# Patient Record
Sex: FEMALE | Race: WHITE | NOT HISPANIC OR LATINO | Employment: OTHER | URBAN - NONMETROPOLITAN AREA
[De-identification: names, ages, dates, MRNs, and addresses within clinical notes are randomized per-mention and may not be internally consistent; named-entity substitution may affect disease eponyms.]

---

## 2017-01-10 ENCOUNTER — TRANSCRIBE ORDERS (OUTPATIENT)
Dept: ADMINISTRATIVE | Facility: HOSPITAL | Age: 67
End: 2017-01-10

## 2017-01-10 DIAGNOSIS — Z12.31 VISIT FOR SCREENING MAMMOGRAM: Primary | ICD-10-CM

## 2017-01-16 ENCOUNTER — HOSPITAL ENCOUNTER (OUTPATIENT)
Dept: MAMMOGRAPHY | Facility: HOSPITAL | Age: 67
Discharge: HOME | End: 2017-01-16
Attending: FAMILY MEDICINE

## 2017-01-20 ENCOUNTER — HOSPITAL ENCOUNTER (OUTPATIENT)
Dept: MAMMOGRAPHY | Facility: HOSPITAL | Age: 67
Discharge: HOME | End: 2017-01-20
Attending: FAMILY MEDICINE | Admitting: FAMILY MEDICINE

## 2017-01-20 DIAGNOSIS — Z12.31 VISIT FOR SCREENING MAMMOGRAM: ICD-10-CM

## 2017-01-20 PROCEDURE — G0202 SCR MAMMO BI INCL CAD: HCPCS

## 2017-01-20 PROCEDURE — 77063 BREAST TOMOSYNTHESIS BI: CPT

## 2017-01-20 PROCEDURE — 77067 SCR MAMMO BI INCL CAD: CPT | Performed by: RADIOLOGY

## 2017-01-20 PROCEDURE — 77063 BREAST TOMOSYNTHESIS BI: CPT | Performed by: RADIOLOGY

## 2017-03-20 ENCOUNTER — OFFICE VISIT (OUTPATIENT)
Dept: CARDIOLOGY | Facility: CLINIC | Age: 67
End: 2017-03-20

## 2017-03-20 VITALS
DIASTOLIC BLOOD PRESSURE: 63 MMHG | SYSTOLIC BLOOD PRESSURE: 116 MMHG | HEART RATE: 68 BPM | HEIGHT: 66 IN | WEIGHT: 193 LBS | BODY MASS INDEX: 31.02 KG/M2

## 2017-03-20 DIAGNOSIS — E78.5 DYSLIPIDEMIA: ICD-10-CM

## 2017-03-20 DIAGNOSIS — I05.0 MITRAL VALVE STENOSIS, UNSPECIFIED ETIOLOGY: ICD-10-CM

## 2017-03-20 DIAGNOSIS — E11.9 TYPE 2 DIABETES MELLITUS WITHOUT COMPLICATION, WITHOUT LONG-TERM CURRENT USE OF INSULIN (HCC): ICD-10-CM

## 2017-03-20 DIAGNOSIS — I10 ESSENTIAL HYPERTENSION: Primary | ICD-10-CM

## 2017-03-20 DIAGNOSIS — E66.8 MODERATE OBESITY: ICD-10-CM

## 2017-03-20 PROCEDURE — 99214 OFFICE O/P EST MOD 30 MIN: CPT | Performed by: INTERNAL MEDICINE

## 2017-03-20 PROCEDURE — 93000 ELECTROCARDIOGRAM COMPLETE: CPT | Performed by: INTERNAL MEDICINE

## 2017-03-20 NOTE — PROGRESS NOTES
Melly Wynn  1950 03/20/2017   Ref:No referring provider defined for this encounter.  Pcp: Samuel Duane Kreis, MD  [address] [place] [address] [address] [place] [address]    Follow up for:  Chief Complaint   Patient presents with   • Follow-up        Patient Active Problem List   Diagnosis   • Mild Mitral stenosis with mild mitral regurgitation with mild pulmonary hypertension.   • Hypertension   • Dyslipidemia   • Type 2 diabetes mellitus       HPI     Melly Wynn is a 65 yo female how presents to the follow today for follow-up of mild mitral stenosis. Patient notes overall she is doing well. Patient notes BP at home runs 130's/70's. She notes during the summer she walks 1 mile a day. Patient denies chest pain, shortness of breath, edema, palpitations, dizziness, and no syncope.     Review of Systems   Constitution: Negative for chills and fever.   HENT: Negative for headaches, nosebleeds and sore throat.    Cardiovascular: Negative for chest pain, leg swelling, palpitations and syncope.   Respiratory: Negative for cough, hemoptysis, shortness of breath and wheezing.    Gastrointestinal: Negative for abdominal pain, hematemesis, hematochezia, melena, nausea and vomiting.   Genitourinary: Negative for dysuria and hematuria.   Neurological: Negative for dizziness.         Current Outpatient Prescriptions:   •  alendronate (FOSAMAX) 70 MG tablet, Take 70 mg by mouth every 7 days., Disp: , Rfl:   •  aspirin 81 MG chewable tablet, Chew 81 mg daily., Disp: , Rfl:   •  Calcium Carb-Cholecalciferol (CALCIUM PLUS VITAMIN D3) 600-500 MG-UNIT capsule, Take  by mouth 2 (two) times a day., Disp: , Rfl:   •  diltiazem (CARDIZEM) 120 MG tablet, Take 120 mg by mouth daily., Disp: , Rfl:   •  ferrous gluconate (FERGON) 324 MG tablet, Take 324 mg by mouth daily with breakfast., Disp: , Rfl:   •  glyBURIDE (DIABETA) 5 MG tablet, Take 5 mg by mouth 2 (two) times a day., Disp: , Rfl:   •  levothyroxine (SYNTHROID, LEVOTHROID) 88 MCG  "tablet, Take 88 mcg by mouth daily., Disp: , Rfl:   •  lisinopril (PRINIVIL,ZESTRIL) 20 MG tablet, Take 20 mg by mouth daily., Disp: , Rfl:   •  metFORMIN (GLUCOPHAGE) 500 MG tablet, Take 500 mg by mouth 2 (two) times a day with meals., Disp: , Rfl:   •  metoprolol tartrate (LOPRESSOR) 25 MG tablet, TAKE ONE TABLET BY MOUTH TWICE A DAY, Disp: 60 tablet, Rfl: 1  •  Omega-3 Fatty Acids (FISH OIL) 1000 MG capsule capsule, Take 1,200 mg by mouth daily with breakfast., Disp: , Rfl:   •  rosuvastatin (CRESTOR) 20 MG tablet, Take 20 mg by mouth daily., Disp: , Rfl:   •  topiramate (TOPAMAX) 50 MG tablet, Take 50 mg by mouth 2 (two) times a day., Disp: , Rfl:   •  calcium carbonate (OS-LIDIA) 600 MG tablet, Take 600 mg by mouth daily., Disp: , Rfl:   •  Empagliflozin (JARDIANCE) 10 MG tablet, Take 10 mg by mouth daily., Disp: , Rfl:   •  nitroglycerin (NITROLINGUAL) 0.4 MG/SPRAY spray, Place 1 spray under the tongue every 5 (five) minutes as needed for chest pain., Disp: , Rfl:   •  omeprazole (PriLOSEC) 20 MG capsule, Take 20 mg by mouth daily., Disp: , Rfl:     Allergies   Allergen Reactions   • Lorcet [Hydrocodone-Acetaminophen]    • Penicillins    • Sulfa Antibiotics        Visit Vitals   • /63 (BP Location: Left arm, Patient Position: Sitting, Cuff Size: Adult)   • Pulse 68   • Ht 66\" (167.6 cm)   • Wt 193 lb (87.5 kg)   • BMI 31.15 kg/m2          Physical Exam   Constitutional: She is oriented to person, place, and time. She appears well-developed and well-nourished. No distress.   Moderately obese   Neck: No JVD present. No tracheal deviation present.   Cardiovascular: Normal rate, regular rhythm, normal heart sounds and intact distal pulses.  Exam reveals no gallop and no friction rub.    No murmur heard.  Pulses:       Carotid pulses are 2+ on the right side, and 2+ on the left side.       Posterior tibial pulses are 1+ on the right side, and 1+ on the left side.   Pulmonary/Chest: Effort normal and breath " sounds normal. No respiratory distress. She has no wheezes. She has no rales. She exhibits no tenderness.   Abdominal: Soft. Bowel sounds are normal. She exhibits no distension and no mass. There is no tenderness. There is no rebound and no guarding.   Moderately obese   Musculoskeletal: She exhibits no edema or tenderness.   Neurological: She is alert and oriented to person, place, and time. She displays normal reflexes. She exhibits normal muscle tone. Coordination normal.   Skin: Skin is warm. No rash noted. She is not diaphoretic. No erythema. No pallor.   Psychiatric: She has a normal mood and affect. Her behavior is normal. Judgment and thought content normal.   :    Lab Review:      ECG 12 Lead  Date/Time: 3/20/2017 3:20 PM  Performed by: CAL LINCOLN  Authorized by: CAL LINCOLN   Comparison: compared with previous ECG from 9/19/2016  Similar to previous ECG  Rhythm: sinus rhythm  Rate: normal  BPM: 64  Conduction: LAFB  Clinical impression: abnormal ECG  Comments: QTc: 415            Lab Results   Component Value Date     04/13/2015    K 3.7 04/13/2015     04/13/2015    CO2 21.9 (L) 04/13/2015    BUN 31 (H) 04/13/2015    CREATININE 1.16 04/13/2015    GLUCOSE 163 (H) 04/13/2015    CALCIUM 10.0 04/13/2015    AST 24 04/13/2015    ALT 21 04/13/2015    ALKPHOS 96 04/13/2015    LABIL2 1.9 04/13/2015     No results found for: CKTOTAL  Lab Results   Component Value Date    WBC 10.8 04/13/2015    HGB 12.4 04/13/2015    HCT 35.0 (L) 04/13/2015     04/13/2015     Lab Results   Component Value Date    INR 0.94 04/13/2015     No results found for: MG  No results found for: TSH, PSA, CHLPL, TRIG, HDL, LDL   Lab Results   Component Value Date    BNP 25 04/13/2015       Chemistry        Component Value Date/Time     04/13/2015 2228    K 3.7 04/13/2015 2228     04/13/2015 2228    CO2 21.9 (L) 04/13/2015 2228    BUN 31 (H) 04/13/2015 2228    CREATININE 1.16 04/13/2015 2228         Component Value Date/Time    CALCIUM 10.0 04/13/2015 2228    ALKPHOS 96 04/13/2015 2228    AST 24 04/13/2015 2228    ALT 21 04/13/2015 2228    BILITOT 0.3 04/13/2015 2228            Impression:   Diagnosis Plan   1. Essential hypertension  ECG 12 Lead   2. Mitral valve stenosis, unspecified etiology     3. Type 2 diabetes mellitus without complication, without long-term current use of insulin     4. Dyslipidemia         Plan:  1. Continue same medications      No Follow-up on file.     This document signed by Bere Lovell MA March 20, 2017 4:07 PM     Scribed for Umnag Aguilera MD by Bere Lovell CMA. 3/20/2017  4:07 PM    I, Umang Aguilera MD, personally performed the services described in this documentation as scribed by the above named individual in my presence, and it is both accurate and complete.  5/8/2017  5:30 PM

## 2017-04-29 ENCOUNTER — HOSPITAL ENCOUNTER (EMERGENCY)
Facility: HOSPITAL | Age: 67
Discharge: ANOTHER HOSPITAL | End: 2017-04-29
Attending: FAMILY MEDICINE | Admitting: FAMILY MEDICINE

## 2017-04-29 ENCOUNTER — APPOINTMENT (OUTPATIENT)
Dept: GENERAL RADIOLOGY | Facility: HOSPITAL | Age: 67
End: 2017-04-29

## 2017-04-29 ENCOUNTER — APPOINTMENT (OUTPATIENT)
Dept: CT IMAGING | Facility: HOSPITAL | Age: 67
End: 2017-04-29

## 2017-04-29 VITALS
HEIGHT: 66 IN | TEMPERATURE: 98.5 F | RESPIRATION RATE: 18 BRPM | OXYGEN SATURATION: 100 % | WEIGHT: 185 LBS | DIASTOLIC BLOOD PRESSURE: 66 MMHG | HEART RATE: 74 BPM | SYSTOLIC BLOOD PRESSURE: 118 MMHG | BODY MASS INDEX: 29.73 KG/M2

## 2017-04-29 DIAGNOSIS — I20.0 UNSTABLE ANGINA PECTORIS (HCC): Primary | ICD-10-CM

## 2017-04-29 LAB
ALBUMIN SERPL-MCNC: 4.6 G/DL (ref 3.4–4.8)
ALBUMIN/GLOB SERPL: 2.1 G/DL (ref 1.5–2.5)
ALP SERPL-CCNC: 66 U/L (ref 35–104)
ALT SERPL W P-5'-P-CCNC: 21 U/L (ref 10–36)
ANION GAP SERPL CALCULATED.3IONS-SCNC: 2.8 MMOL/L (ref 3.6–11.2)
AST SERPL-CCNC: 25 U/L (ref 10–30)
BASOPHILS # BLD AUTO: 0.03 10*3/MM3 (ref 0–0.3)
BASOPHILS NFR BLD AUTO: 0.3 % (ref 0–2)
BILIRUB SERPL-MCNC: 0.3 MG/DL (ref 0.2–1.8)
BUN BLD-MCNC: 22 MG/DL (ref 7–21)
BUN/CREAT SERPL: 22 (ref 7–25)
CALCIUM SPEC-SCNC: 10 MG/DL (ref 7.7–10)
CHLORIDE SERPL-SCNC: 114 MMOL/L (ref 99–112)
CO2 SERPL-SCNC: 25.2 MMOL/L (ref 24.3–31.9)
CREAT BLD-MCNC: 1 MG/DL (ref 0.43–1.29)
DEPRECATED RDW RBC AUTO: 45 FL (ref 37–54)
EOSINOPHIL # BLD AUTO: 0.4 10*3/MM3 (ref 0–0.7)
EOSINOPHIL NFR BLD AUTO: 4.4 % (ref 0–5)
ERYTHROCYTE [DISTWIDTH] IN BLOOD BY AUTOMATED COUNT: 13.1 % (ref 11.5–14.5)
GFR SERPL CREATININE-BSD FRML MDRD: 56 ML/MIN/1.73
GLOBULIN UR ELPH-MCNC: 2.2 GM/DL
GLUCOSE BLD-MCNC: 151 MG/DL (ref 70–110)
HCT VFR BLD AUTO: 37.5 % (ref 37–47)
HGB BLD-MCNC: 11.8 G/DL (ref 12–16)
HOLD SPECIMEN: NORMAL
HOLD SPECIMEN: NORMAL
IMM GRANULOCYTES # BLD: 0.01 10*3/MM3 (ref 0–0.03)
IMM GRANULOCYTES NFR BLD: 0.1 % (ref 0–0.5)
LYMPHOCYTES # BLD AUTO: 1.86 10*3/MM3 (ref 1–3)
LYMPHOCYTES NFR BLD AUTO: 20.5 % (ref 21–51)
MCH RBC QN AUTO: 30.6 PG (ref 27–33)
MCHC RBC AUTO-ENTMCNC: 31.5 G/DL (ref 33–37)
MCV RBC AUTO: 97.2 FL (ref 80–94)
MONOCYTES # BLD AUTO: 0.58 10*3/MM3 (ref 0.1–0.9)
MONOCYTES NFR BLD AUTO: 6.4 % (ref 0–10)
NEUTROPHILS # BLD AUTO: 6.19 10*3/MM3 (ref 1.4–6.5)
NEUTROPHILS NFR BLD AUTO: 68.3 % (ref 30–70)
OSMOLALITY SERPL CALC.SUM OF ELEC: 289.4 MOSM/KG (ref 273–305)
PLATELET # BLD AUTO: 192 10*3/MM3 (ref 130–400)
PMV BLD AUTO: 11.1 FL (ref 6–10)
POTASSIUM BLD-SCNC: 4.3 MMOL/L (ref 3.5–5.3)
PROT SERPL-MCNC: 6.8 G/DL (ref 6–8)
RBC # BLD AUTO: 3.86 10*6/MM3 (ref 4.2–5.4)
SODIUM BLD-SCNC: 142 MMOL/L (ref 135–153)
TROPONIN I SERPL-MCNC: 0.29 NG/ML
TROPONIN I SERPL-MCNC: 0.51 NG/ML
WBC NRBC COR # BLD: 9.07 10*3/MM3 (ref 4.5–12.5)
WHOLE BLOOD HOLD SPECIMEN: NORMAL
WHOLE BLOOD HOLD SPECIMEN: NORMAL

## 2017-04-29 PROCEDURE — 80053 COMPREHEN METABOLIC PANEL: CPT | Performed by: FAMILY MEDICINE

## 2017-04-29 PROCEDURE — 71020 HC CHEST PA AND LATERAL: CPT

## 2017-04-29 PROCEDURE — 99285 EMERGENCY DEPT VISIT HI MDM: CPT

## 2017-04-29 PROCEDURE — 36415 COLL VENOUS BLD VENIPUNCTURE: CPT

## 2017-04-29 PROCEDURE — 85025 COMPLETE CBC W/AUTO DIFF WBC: CPT | Performed by: FAMILY MEDICINE

## 2017-04-29 PROCEDURE — 84484 ASSAY OF TROPONIN QUANT: CPT | Performed by: FAMILY MEDICINE

## 2017-04-29 PROCEDURE — 70450 CT HEAD/BRAIN W/O DYE: CPT

## 2017-04-29 PROCEDURE — 70450 CT HEAD/BRAIN W/O DYE: CPT | Performed by: RADIOLOGY

## 2017-04-29 PROCEDURE — 93010 ELECTROCARDIOGRAM REPORT: CPT | Performed by: INTERNAL MEDICINE

## 2017-04-29 PROCEDURE — 93005 ELECTROCARDIOGRAM TRACING: CPT | Performed by: FAMILY MEDICINE

## 2017-04-29 PROCEDURE — 71020 XR CHEST 2 VW: CPT | Performed by: RADIOLOGY

## 2017-04-29 RX ORDER — SODIUM CHLORIDE 0.9 % (FLUSH) 0.9 %
10 SYRINGE (ML) INJECTION AS NEEDED
Status: DISCONTINUED | OUTPATIENT
Start: 2017-04-29 | End: 2017-04-30 | Stop reason: HOSPADM

## 2017-04-29 RX ORDER — ASPIRIN 325 MG
325 TABLET ORAL ONCE
Status: COMPLETED | OUTPATIENT
Start: 2017-04-29 | End: 2017-04-29

## 2017-04-29 RX ORDER — ACETAMINOPHEN 325 MG/1
1000 TABLET ORAL ONCE
Status: DISCONTINUED | OUTPATIENT
Start: 2017-04-29 | End: 2017-04-29

## 2017-04-29 RX ORDER — ACETAMINOPHEN 325 MG/1
TABLET ORAL
Status: DISCONTINUED
Start: 2017-04-29 | End: 2017-04-30 | Stop reason: HOSPADM

## 2017-04-29 RX ORDER — ACETAMINOPHEN 325 MG/1
975 TABLET ORAL ONCE
Status: COMPLETED | OUTPATIENT
Start: 2017-04-29 | End: 2017-04-29

## 2017-04-29 RX ADMIN — NITROGLYCERIN 1 INCH: 20 OINTMENT TOPICAL at 19:05

## 2017-04-29 RX ADMIN — ACETAMINOPHEN 975 MG: 325 TABLET ORAL at 19:04

## 2017-04-29 RX ADMIN — ASPIRIN 325 MG: 325 TABLET ORAL at 17:19

## 2017-06-01 ENCOUNTER — TELEPHONE (OUTPATIENT)
Dept: CARDIOLOGY | Facility: CLINIC | Age: 67
End: 2017-06-01

## 2017-06-01 ENCOUNTER — OFFICE VISIT (OUTPATIENT)
Dept: CARDIOLOGY | Facility: CLINIC | Age: 67
End: 2017-06-01

## 2017-06-01 VITALS
HEIGHT: 66 IN | SYSTOLIC BLOOD PRESSURE: 119 MMHG | HEART RATE: 88 BPM | RESPIRATION RATE: 16 BRPM | WEIGHT: 198 LBS | BODY MASS INDEX: 31.82 KG/M2 | DIASTOLIC BLOOD PRESSURE: 71 MMHG

## 2017-06-01 DIAGNOSIS — E11.8 TYPE 2 DIABETES MELLITUS WITH COMPLICATION, WITHOUT LONG-TERM CURRENT USE OF INSULIN (HCC): ICD-10-CM

## 2017-06-01 DIAGNOSIS — I10 ESSENTIAL HYPERTENSION: ICD-10-CM

## 2017-06-01 DIAGNOSIS — E78.5 DYSLIPIDEMIA: ICD-10-CM

## 2017-06-01 DIAGNOSIS — I21.4 NON-ST ELEVATION MYOCARDIAL INFARCTION (NSTEMI) (HCC): Primary | ICD-10-CM

## 2017-06-01 DIAGNOSIS — I05.0 MITRAL VALVE STENOSIS, UNSPECIFIED ETIOLOGY: ICD-10-CM

## 2017-06-01 PROCEDURE — 99213 OFFICE O/P EST LOW 20 MIN: CPT | Performed by: PHYSICIAN ASSISTANT

## 2017-06-01 NOTE — PROGRESS NOTES
Samuel Duane Kreis, MD  Melly Wynn  1950 06/01/2017    Patient Active Problem List   Diagnosis   • Mild Mitral stenosis with mild mitral regurgitation with mild pulmonary hypertension.   • Hypertension   • Dyslipidemia   • Type 2 diabetes mellitus   • Moderate obesity     Dear Samuel Duane Kreis, MD:    Chief Complaint   Patient presents with   • Follow-up     [facility] in [place]   • Meds     List     Subjective     Melly Wynn is a 66 y.o. female with a past medical history significant forMild mitral stenosis, hypertension, dyslipidemia, and type 2 diabetes mellitus.  The patient was apparently recently treated for myocardial infarction and had a cardiac catheterization at [facility] in [facility].  Review of records revealed that she presented to the emergency department [facility] on 4/29/2017 with complaints of chest pain.  Troponin levels were elevated at 0.293 and 0.511.  Patient chose to be transferred to [facility].  Her cardiac catheterization there did not reveal any significant coronary artery disease and she was discharged home.  She states she was also checked for blood clots in her legs and lungs and was negative per the patient.  She presents back to the office today for follow-up visit. She has not had any further chest pains.  She states that at the time of her chest pain she was under a lot of stress.      Current Outpatient Prescriptions:   •  alendronate (FOSAMAX) 70 MG tablet, Take 70 mg by mouth every 7 days., Disp: , Rfl:   •  aspirin 81 MG chewable tablet, Chew 81 mg daily., Disp: , Rfl:   •  Calcium Carb-Cholecalciferol (CALCIUM PLUS VITAMIN D3) 600-500 MG-UNIT capsule, Take  by mouth 2 (two) times a day., Disp: , Rfl:   •  diltiazem (CARDIZEM) 120 MG tablet, Take 120 mg by mouth daily., Disp: , Rfl:   •  Ferrous Sulfate (IRON) 28 MG tablet, Take  by mouth., Disp: , Rfl:   •  glyBURIDE (DIABETA) 5 MG tablet, Take  5 mg by mouth 2 (two) times a day., Disp: , Rfl:   •  levothyroxine (SYNTHROID, LEVOTHROID) 88 MCG tablet, Take 88 mcg by mouth daily., Disp: , Rfl:   •  lisinopril (PRINIVIL,ZESTRIL) 20 MG tablet, Take 20 mg by mouth daily., Disp: , Rfl:   •  metFORMIN (GLUCOPHAGE) 500 MG tablet, Take 500 mg by mouth 2 (two) times a day with meals., Disp: , Rfl:   •  nitroglycerin (NITROLINGUAL) 0.4 MG/SPRAY spray, Place 1 spray under the tongue every 5 (five) minutes as needed for chest pain., Disp: , Rfl:   •  Omega-3 Fatty Acids (FISH OIL) 1000 MG capsule capsule, Take 1,200 mg by mouth daily with breakfast., Disp: , Rfl:   •  omeprazole (PriLOSEC) 20 MG capsule, Take 20 mg by mouth daily., Disp: , Rfl:   •  rosuvastatin (CRESTOR) 20 MG tablet, Take 20 mg by mouth daily., Disp: , Rfl:   •  topiramate (TOPAMAX) 50 MG tablet, Take 50 mg by mouth 2 (two) times a day., Disp: , Rfl:   •  metoprolol tartrate (LOPRESSOR) 25 MG tablet, TAKE ONE TABLET BY MOUTH TWICE A DAY, Disp: 60 tablet, Rfl: 1    The following portions of the patient's history were reviewed and updated as appropriate: allergies, current medications, past family history, past medical history, past social history, past surgical history and problem list.    Social History     Social History   • Marital status:      Spouse name: N/A   • Number of children: N/A   • Years of education: N/A     Occupational History   • Not on file.     Social History Main Topics   • Smoking status: Never Smoker   • Smokeless tobacco: Never Used   • Alcohol use No   • Drug use: No   • Sexual activity: Not on file     Other Topics Concern   • Not on file     Social History Narrative     Review of Systems   Constitution: Negative for fever and weakness.   Cardiovascular: Negative for chest pain, dyspnea on exertion, leg swelling, near-syncope, palpitations and syncope.   Respiratory: Negative for shortness of breath.    Neurological: Negative for dizziness.     Objective   Blood  "pressure 119/71, pulse 88, resp. rate 16, height 66\" (167.6 cm), weight 198 lb (89.8 kg).    Physical Exam   Constitutional: She is oriented to person, place, and time. She appears well-developed and well-nourished. No distress.   HENT:   Head: Normocephalic and atraumatic.   Eyes: Conjunctivae are normal. Right eye exhibits no discharge. Left eye exhibits no discharge.   Neck: Normal range of motion. Neck supple. Carotid bruit is not present.   Cardiovascular: Normal rate, regular rhythm and normal heart sounds.  Exam reveals no gallop and no friction rub.    No murmur heard.  Pulmonary/Chest: Effort normal and breath sounds normal. No respiratory distress. She has no wheezes. She has no rales. She exhibits no tenderness.   Musculoskeletal: Normal range of motion. She exhibits no edema.   Neurological: She is alert and oriented to person, place, and time.   Skin: Skin is warm and dry. No rash noted. She is not diaphoretic. No erythema. No pallor.   Psychiatric: She has a normal mood and affect. Her behavior is normal.   Nursing note and vitals reviewed.    Procedures   Cardiac catheterization [facility] 5/1/17    Assessment:          Diagnosis Plan   1. ASCVD (arteriosclerotic cardiovascular disease)     2. Essential hypertension     3. Mitral valve stenosis, unspecified etiology     4. Dyslipidemia     5. Type 2 diabetes mellitus with complication, without long-term current use of insulin          Plan:       1. Will try to get recent records from [facility].   2. If no echocardiogram was performed we will order one.  3. Will get blood work from Dr. Gilliam' office.   4. Continue aspirin and crestor.  She states that her metoprolol was discontinued while at [facility].  She is unclear the reason.  We'll continue to hold it for now.  5. We'll follow-up patient in 2 months or sooner if needed.    No Follow-up on file.    I appreciate the opportunity to participate in this " patient's cardiovascular care.    Best Regards,    Ute Doherty PA-C

## 2017-08-04 ENCOUNTER — OFFICE VISIT (OUTPATIENT)
Dept: CARDIOLOGY | Facility: CLINIC | Age: 67
End: 2017-08-04

## 2017-08-04 VITALS
HEART RATE: 59 BPM | DIASTOLIC BLOOD PRESSURE: 63 MMHG | RESPIRATION RATE: 16 BRPM | WEIGHT: 186 LBS | SYSTOLIC BLOOD PRESSURE: 104 MMHG | HEIGHT: 66 IN | BODY MASS INDEX: 29.89 KG/M2

## 2017-08-04 DIAGNOSIS — E78.5 DYSLIPIDEMIA: ICD-10-CM

## 2017-08-04 DIAGNOSIS — I10 ESSENTIAL HYPERTENSION: Primary | ICD-10-CM

## 2017-08-04 DIAGNOSIS — E11.9 TYPE 2 DIABETES MELLITUS WITHOUT COMPLICATION, WITHOUT LONG-TERM CURRENT USE OF INSULIN (HCC): ICD-10-CM

## 2017-08-04 PROCEDURE — 99213 OFFICE O/P EST LOW 20 MIN: CPT | Performed by: PHYSICIAN ASSISTANT

## 2017-08-04 RX ORDER — CHOLECALCIFEROL (VITAMIN D3) 125 MCG
2000 CAPSULE ORAL 2 TIMES DAILY
COMMUNITY
End: 2020-07-17 | Stop reason: ALTCHOICE

## 2017-08-04 NOTE — PROGRESS NOTES
Samuel Duane Kreis, MD  Melly Wynn  1950 08/04/2017    Patient Active Problem List   Diagnosis   • Mild Mitral stenosis with mild mitral regurgitation with mild pulmonary hypertension.   • Hypertension   • Dyslipidemia   • Type 2 diabetes mellitus   • Moderate obesity       Dear Samuel Duane Kreis, MD:    Chief Complaint   Patient presents with   • Follow-up     2 mos   • meds     list provided     Subjective     Melly Wynn is a 66 y.o. female with a past medical history significant for Mild mitral stenosis, hypertension, dyslipidemia, and type 2 diabetes mellitus.  In April 2017, it was felt that she possibly had a non-ST elevation myocardial infarction was transferred to [facility].  At that time her cardiac catheterization did not reveal any significant coronary artery disease and she was discharged home. She denies any recent complaints of shortness of breath or chest pain.  No dizziness, near syncope, or palpitations.  No edema of the lower extremities.  Overall she has been doing well.      Current Outpatient Prescriptions:   •  alendronate (FOSAMAX) 70 MG tablet, Take 70 mg by mouth every 7 days., Disp: , Rfl:   •  aspirin 81 MG chewable tablet, Chew 81 mg daily., Disp: , Rfl:   •  Calcium Carb-Cholecalciferol (CALCIUM PLUS VITAMIN D3) 600-500 MG-UNIT capsule, Take  by mouth 2 (two) times a day., Disp: , Rfl:   •  Cholecalciferol (VITAMIN D3) 2000 UNITS tablet, Take 2,000 Units by mouth 2 (Two) Times a Day., Disp: , Rfl:   •  diltiazem (CARDIZEM) 120 MG tablet, Take 120 mg by mouth daily., Disp: , Rfl:   •  Ferrous Sulfate (IRON) 28 MG tablet, Take 65 mg by mouth Daily., Disp: , Rfl:   •  glyBURIDE (DIABETA) 5 MG tablet, Take 5 mg by mouth 2 (two) times a day., Disp: , Rfl:   •  levothyroxine (SYNTHROID, LEVOTHROID) 88 MCG tablet, Take 88 mcg by mouth daily., Disp: , Rfl:   •  lisinopril (PRINIVIL,ZESTRIL) 20 MG tablet, Take 20 mg by mouth 2 (Two) Times a Day., Disp: , Rfl:  "  •  metFORMIN (GLUCOPHAGE) 500 MG tablet, Take 500 mg by mouth 2 (two) times a day with meals., Disp: , Rfl:   •  Omega-3 Fatty Acids (FISH OIL) 1000 MG capsule capsule, Take 1,200 mg by mouth daily with breakfast., Disp: , Rfl:   •  omeprazole (PriLOSEC) 20 MG capsule, Take 20 mg by mouth daily., Disp: , Rfl:   •  rosuvastatin (CRESTOR) 20 MG tablet, Take 20 mg by mouth daily., Disp: , Rfl:   •  topiramate (TOPAMAX) 50 MG tablet, Take 50 mg by mouth 2 (two) times a day., Disp: , Rfl:     The following portions of the patient's history were reviewed and updated as appropriate: allergies, current medications, past family history, past medical history, past social history, past surgical history and problem list.    Social History     Social History   • Marital status:      Spouse name: N/A   • Number of children: N/A   • Years of education: N/A     Occupational History   • Not on file.     Social History Main Topics   • Smoking status: Never Smoker   • Smokeless tobacco: Never Used   • Alcohol use No   • Drug use: No   • Sexual activity: Not on file     Other Topics Concern   • Not on file     Social History Narrative     Review of Systems   Constitution: Negative for weakness and malaise/fatigue.   Cardiovascular: Negative for chest pain, dyspnea on exertion, near-syncope and syncope.   Respiratory: Negative for shortness of breath.    Neurological: Negative for dizziness.     Objective   Blood pressure 104/63, pulse 59, resp. rate 16, height 66\" (167.6 cm), weight 186 lb (84.4 kg).    Body mass index is 30.02 kg/(m^2).    Physical Exam   Constitutional: She is oriented to person, place, and time. She appears well-developed and well-nourished. No distress.   HENT:   Head: Normocephalic and atraumatic.   Eyes: Conjunctivae are normal. Right eye exhibits no discharge. Left eye exhibits no discharge.   Neck: Normal range of motion. Neck supple. Carotid bruit is not present.   Cardiovascular: Normal rate, regular " rhythm and normal heart sounds.  Exam reveals no gallop and no friction rub.    No murmur heard.  Pulmonary/Chest: Effort normal and breath sounds normal. No respiratory distress. She has no wheezes. She has no rales. She exhibits no tenderness.   Musculoskeletal: Normal range of motion. She exhibits no edema.   Neurological: She is alert and oriented to person, place, and time.   Skin: Skin is warm and dry. No rash noted. She is not diaphoretic. No erythema. No pallor.   Psychiatric: She has a normal mood and affect. Her behavior is normal.   Nursing note and vitals reviewed.      Procedures  Left heart catheterization 05/02/17    Assessment:          Diagnosis Plan   1. Essential hypertension     2. Dyslipidemia     3. Type 2 diabetes mellitus without complication, without long-term current use of insulin          Plan:       1. Continue current therapy.  2. I have instructed her to monitor for any dizziness or fatigue at home and to check her blood pressure if she is feeling that way.  She follows up with primary care on 8/17/17.  We'll have her discuss with Dr. Gilliam if her blood pressure continues to run low to see if her medications may need adjusted.  3. At this point she appears stable from the cardiac standpoint and we will follow-up in 4 months or sooner if needed.    No Follow-up on file.    I appreciate the opportunity to participate in this patient's cardiovascular care.    Best Regards,    Ute Doherty PA-C

## 2017-12-26 ENCOUNTER — LAB (OUTPATIENT)
Dept: LAB | Facility: HOSPITAL | Age: 67
End: 2017-12-26

## 2017-12-26 ENCOUNTER — TRANSCRIBE ORDERS (OUTPATIENT)
Dept: ADMINISTRATIVE | Facility: HOSPITAL | Age: 67
End: 2017-12-26

## 2017-12-26 DIAGNOSIS — E87.5 HYPERPOTASSEMIA: Primary | ICD-10-CM

## 2017-12-26 DIAGNOSIS — E87.5 HYPERPOTASSEMIA: ICD-10-CM

## 2017-12-26 LAB
ANION GAP SERPL CALCULATED.3IONS-SCNC: 6.7 MMOL/L (ref 3.6–11.2)
BUN BLD-MCNC: 27 MG/DL (ref 7–21)
BUN/CREAT SERPL: 23.5 (ref 7–25)
CALCIUM SPEC-SCNC: 9.6 MG/DL (ref 7.7–10)
CHLORIDE SERPL-SCNC: 113 MMOL/L (ref 99–112)
CO2 SERPL-SCNC: 23.3 MMOL/L (ref 24.3–31.9)
CREAT BLD-MCNC: 1.15 MG/DL (ref 0.43–1.29)
GFR SERPL CREATININE-BSD FRML MDRD: 47 ML/MIN/1.73
GLUCOSE BLD-MCNC: 125 MG/DL (ref 70–110)
OSMOLALITY SERPL CALC.SUM OF ELEC: 291.6 MOSM/KG (ref 273–305)
POTASSIUM BLD-SCNC: 4.9 MMOL/L (ref 3.5–5.3)
SODIUM BLD-SCNC: 143 MMOL/L (ref 135–153)

## 2017-12-26 PROCEDURE — 80048 BASIC METABOLIC PNL TOTAL CA: CPT

## 2017-12-26 PROCEDURE — 36415 COLL VENOUS BLD VENIPUNCTURE: CPT

## 2018-02-14 ENCOUNTER — TRANSCRIBE ORDERS (OUTPATIENT)
Dept: ADMINISTRATIVE | Facility: HOSPITAL | Age: 68
End: 2018-02-14

## 2018-02-14 DIAGNOSIS — Z12.31 VISIT FOR SCREENING MAMMOGRAM: Primary | ICD-10-CM

## 2018-03-08 ENCOUNTER — HOSPITAL ENCOUNTER (OUTPATIENT)
Dept: MAMMOGRAPHY | Facility: HOSPITAL | Age: 68
Discharge: HOME | End: 2018-03-08
Attending: FAMILY MEDICINE | Admitting: FAMILY MEDICINE

## 2018-03-08 DIAGNOSIS — Z12.31 VISIT FOR SCREENING MAMMOGRAM: ICD-10-CM

## 2018-03-08 PROCEDURE — 77067 SCR MAMMO BI INCL CAD: CPT | Performed by: RADIOLOGY

## 2018-03-08 PROCEDURE — 77067 SCR MAMMO BI INCL CAD: CPT

## 2018-03-08 PROCEDURE — 77063 BREAST TOMOSYNTHESIS BI: CPT | Performed by: RADIOLOGY

## 2018-03-08 PROCEDURE — 77063 BREAST TOMOSYNTHESIS BI: CPT

## 2018-03-30 ENCOUNTER — OFFICE VISIT (OUTPATIENT)
Dept: CARDIOLOGY | Facility: CLINIC | Age: 68
End: 2018-03-30

## 2018-03-30 VITALS
OXYGEN SATURATION: 99 % | RESPIRATION RATE: 16 BRPM | DIASTOLIC BLOOD PRESSURE: 71 MMHG | HEIGHT: 66 IN | BODY MASS INDEX: 30.86 KG/M2 | HEART RATE: 75 BPM | WEIGHT: 192 LBS | SYSTOLIC BLOOD PRESSURE: 122 MMHG

## 2018-03-30 DIAGNOSIS — I05.0 MITRAL VALVE STENOSIS, UNSPECIFIED ETIOLOGY: ICD-10-CM

## 2018-03-30 DIAGNOSIS — E11.9 TYPE 2 DIABETES MELLITUS WITHOUT COMPLICATION, WITHOUT LONG-TERM CURRENT USE OF INSULIN (HCC): ICD-10-CM

## 2018-03-30 DIAGNOSIS — I21.4 NON-ST ELEVATION MYOCARDIAL INFARCTION (NSTEMI) (HCC): Primary | ICD-10-CM

## 2018-03-30 DIAGNOSIS — E78.5 DYSLIPIDEMIA: ICD-10-CM

## 2018-03-30 DIAGNOSIS — I10 ESSENTIAL HYPERTENSION: ICD-10-CM

## 2018-03-30 PROCEDURE — 99213 OFFICE O/P EST LOW 20 MIN: CPT | Performed by: PHYSICIAN ASSISTANT

## 2018-03-30 RX ORDER — LISINOPRIL 20 MG/1
TABLET ORAL
Start: 2018-03-30

## 2018-03-30 NOTE — PROGRESS NOTES
Samuel Duane Kreis, MD  Melly Wynn  1950 03/30/2018    Patient Active Problem List   Diagnosis   • Mild Mitral stenosis with mild mitral regurgitation with mild pulmonary hypertension.   • Hypertension   • Dyslipidemia   • Type 2 diabetes mellitus   • Moderate obesity     Dear Samuel Duane Kreis, MD:    Chief Complaint   Patient presents with   • Hypertension     states doing well, labs from PCP requested,      Subjective     Melly Wynn is a 67 y.o. female with a past medical history significant for mild mitral stenosis, hypertension, dyslipidemia, and type 2 diabetes mellitus.  In April 2017, it was felt that she possibly had a non-ST elevation myocardial infarction was transferred to [facility].  At that time her cardiac catheterization did not reveal any significant coronary artery disease and she was discharged home. She presents to the office today for routine follow-up.  Recent labs from PCP revealed LDL elevated potassium at 5.4.  She states she has been having trouble with high potassium and has been trying to adjust her diet to help with this.  She denies any chest pains, shortness of breath, palpitations, or pedal edema.  Blood pressure has been well-controlled.      Current Outpatient Prescriptions:   •  aspirin 81 MG chewable tablet, Chew 81 mg daily., Disp: , Rfl:   •  Calcium Carb-Cholecalciferol (CALCIUM PLUS VITAMIN D3) 600-500 MG-UNIT capsule, Take  by mouth 2 (two) times a day., Disp: , Rfl:   •  Cholecalciferol (VITAMIN D3) 2000 UNITS tablet, Take 2,000 Units by mouth 2 (Two) Times a Day., Disp: , Rfl:   •  diltiazem (CARDIZEM) 120 MG tablet, Take 120 mg by mouth daily., Disp: , Rfl:   •  Ferrous Sulfate (IRON) 28 MG tablet, Take 65 mg by mouth Daily., Disp: , Rfl:   •  glyBURIDE (DIABETA) 5 MG tablet, Take 5 mg by mouth 2 (two) times a day., Disp: , Rfl:   •  levothyroxine (SYNTHROID, LEVOTHROID) 88 MCG tablet, Take 88 mcg by mouth daily., Disp: , Rfl:   •   "lisinopril (PRINIVIL,ZESTRIL) 20 MG tablet, 20mg AM, 10mg PM., Disp: , Rfl:   •  metFORMIN (GLUCOPHAGE) 500 MG tablet, Take 500 mg by mouth 2 (two) times a day with meals., Disp: , Rfl:   •  Omega-3 Fatty Acids (FISH OIL) 1000 MG capsule capsule, Take 1,200 mg by mouth daily with breakfast., Disp: , Rfl:   •  omeprazole (PriLOSEC) 20 MG capsule, Take 20 mg by mouth daily., Disp: , Rfl:   •  rosuvastatin (CRESTOR) 20 MG tablet, Take 20 mg by mouth daily., Disp: , Rfl:   •  topiramate (TOPAMAX) 50 MG tablet, Take 50 mg by mouth 2 (two) times a day., Disp: , Rfl:     The following portions of the patient's history were reviewed and updated as appropriate: allergies, current medications, past family history, past medical history, past social history, past surgical history and problem list.    Review of Systems   Cardiovascular: Negative for chest pain, dyspnea on exertion, irregular heartbeat, leg swelling, near-syncope, orthopnea and palpitations.   Respiratory: Negative for shortness of breath.    Neurological: Negative for dizziness and light-headedness.   Psychiatric/Behavioral: The patient has insomnia (from fibromyalgia pain).      Objective   Blood pressure 122/71, pulse 75, resp. rate 16, height 167.6 cm (66\"), weight 87.1 kg (192 lb), SpO2 99 %.    Physical Exam   Constitutional: She is oriented to person, place, and time. She appears well-developed and well-nourished. No distress.   HENT:   Head: Normocephalic and atraumatic.   Eyes: Conjunctivae are normal. Right eye exhibits no discharge. Left eye exhibits no discharge.   Neck: Normal range of motion. Neck supple. Carotid bruit is not present.   Cardiovascular: Normal rate and regular rhythm.  Exam reveals no gallop and no friction rub.    Murmur (Soft systolic heart murmur right second intercostal space and left lower sternal border) heard.  Pulmonary/Chest: Effort normal and breath sounds normal. No respiratory distress. She has no wheezes. She has no " rales. She exhibits no tenderness.   Musculoskeletal: Normal range of motion. She exhibits no edema.   Neurological: She is alert and oriented to person, place, and time.   Skin: Skin is warm and dry. No rash noted. She is not diaphoretic. No erythema. No pallor.   Psychiatric: She has a normal mood and affect. Her behavior is normal.   Nursing note and vitals reviewed.      Procedures  Left heart catheterization 05/02/17    Assessment:        Diagnosis Plan   1. Non-ST elevation myocardial infarction (NSTEMI)      Reported NSTEMI with left heart catheterization 05/20/17 revealing no significant stenosis.   2. Mitral valve stenosis, unspecified etiology     3. Essential hypertension  Basic Metabolic Panel   4. Dyslipidemia  Basic Metabolic Panel   5. Type 2 diabetes mellitus without complication, without long-term current use of insulin          Plan:       1. Decrease lisinopril from 20mg BID to 20mg AM and 10mg PM.   2. Recheck BMP in 10 days.   3. Monitor blood pressure and call if regarding greater than 140/90.  Avoid sodium and potassium in the diet.  4. Continue low-dose aspirin and Crestor.  5. Follow up in 6 months or sooner if needed.     No Follow-up on file.    I appreciate the opportunity to participate in this patient's cardiovascular care.    Best Regards,    Ute Doherty PA-C

## 2018-03-30 NOTE — PATIENT INSTRUCTIONS
BMI for Adults  Body mass index (BMI) is a number that is calculated from a person's weight and height. In most adults, the number is used to find how much of an adult's weight is made up of fat. BMI is not as accurate as a direct measure of body fat.  HOW IS BMI CALCULATED?  BMI is calculated by dividing weight in kilograms by height in meters squared. It can also be calculated by dividing weight in pounds by height in inches squared, then multiplying the resulting number by 703. Charts are available to help you find your BMI quickly and easily without doing this calculation.   HOW IS BMI INTERPRETED?  Health care professionals use BMI charts to identify whether an adult is underweight, at a normal weight, or overweight based on the following guidelines:  · Underweight: BMI less than 18.5.  · Normal weight: BMI between 18.5 and 24.9.  · Overweight: BMI between 25 and 29.9.  · Obese: BMI of 30 and above.  BMI is usually interpreted the same for males and females.  Weight includes both fat and muscle, so someone with a muscular build, such as an athlete, may have a BMI that is higher than 24.9. In cases like these, BMI may not accurately depict body fat. To determine if excess body fat is the cause of a BMI of 25 or higher, further assessments may need to be done by a health care provider.  WHY IS BMI A USEFUL TOOL?  BMI is used to identify a possible weight problem that may be related to a medical problem or may increase the risk for medical problems. BMI can also be used to promote changes to reach a healthy weight.     This information is not intended to replace advice given to you by your health care provider. Make sure you discuss any questions you have with your health care provider.     Document Released: 08/29/2005 Document Revised: 01/08/2016 Document Reviewed: 05/15/2015  [employer] Interactive Patient Education ©2017 [employer]       Calorie Counting for Weight Loss  Calories are energy you get from the  things you eat and drink. Your body uses this energy to keep you going throughout the day. The number of calories you eat affects your weight. When you eat more calories than your body needs, your body stores the extra calories as fat. When you eat fewer calories than your body needs, your body burns fat to get the energy it needs.  Calorie counting means keeping track of how many calories you eat and drink each day. If you make sure to eat fewer calories than your body needs, you should lose weight. In order for calorie counting to work, you will need to eat the number of calories that are right for you in a day to lose a healthy amount of weight per week. A healthy amount of weight to lose per week is usually 1-2 lb (0.5-0.9 kg). A dietitian can determine how many calories you need in a day and give you suggestions on how to reach your calorie goal.   WHAT IS MY MY PLAN?  My goal is to have __________ calories per day.   If I have this many calories per day, I should lose around __________ pounds per week.  WHAT DO I NEED TO KNOW ABOUT CALORIE COUNTING?  In order to meet your daily calorie goal, you will need to:  · Find out how many calories are in each food you would like to eat. Try to do this before you eat.  · Decide how much of the food you can eat.  · Write down what you ate and how many calories it had. Doing this is called keeping a food log.  WHERE DO I FIND CALORIE INFORMATION?  The number of calories in a food can be found on a Nutrition Facts label. Note that all the information on a label is based on a specific serving of the food. If a food does not have a Nutrition Facts label, try to look up the calories online or ask your dietitian for help.  HOW DO I DECIDE HOW MUCH TO EAT?  To decide how much of the food you can eat, you will need to consider both the number of calories in one serving and the size of one serving. This information can be found on the Nutrition Facts label. If a food does not  have a Nutrition Facts label, look up the information online or ask your dietitian for help.  Remember that calories are listed per serving. If you choose to have more than one serving of a food, you will have to multiply the calories per serving by the amount of servings you plan to eat. For example, the label on a package of bread might say that a serving size is 1 slice and that there are 90 calories in a serving. If you eat 1 slice, you will have eaten 90 calories. If you eat 2 slices, you will have eaten 180 calories.  HOW DO I KEEP A FOOD LOG?  After each meal, record the following information in your food log:  · What you ate.  · How much of it you ate.  · How many calories it had.  · Then, add up your calories.  Keep your food log near you, such as in a small notebook in your pocket. Another option is to use a mobile henri or website. Some programs will calculate calories for you and show you how many calories you have left each time you add an item to the log.  WHAT ARE SOME CALORIE COUNTING TIPS?  · Use your calories on foods and drinks that will fill you up and not leave you hungry. Some examples of this include foods like nuts and nut butters, vegetables, lean proteins, and high-fiber foods (more than 5 g fiber per serving).  · Eat nutritious foods and avoid empty calories. Empty calories are calories you get from foods or beverages that do not have many nutrients, such as candy and soda. It is better to have a nutritious high-calorie food (such as an avocado) than a food with few nutrients (such as a bag of chips).  · Know how many calories are in the foods you eat most often. This way, you do not have to look up how many calories they have each time you eat them.  · Look out for foods that may seem like low-calorie foods but are really high-calorie foods, such as baked goods, soda, and fat-free candy.  · Pay attention to calories in drinks. Drinks such as sodas, specialty coffee drinks, alcohol, and  juices have a lot of calories yet do not fill you up. Choose low-calorie drinks like water and diet drinks.  · Focus your calorie counting efforts on higher calorie items. Logging the calories in a garden salad that contains only vegetables is less important than calculating the calories in a milk shake.  · Find a way of tracking calories that works for you. Get creative. Most people who are successful find ways to keep track of how much they eat in a day, even if they do not count every calorie.  WHAT ARE SOME PORTION CONTROL TIPS?  · Know how many calories are in a serving. This will help you know how many servings of a certain food you can have.  · Use a measuring cup to measure serving sizes. This is helpful when you start out. With time, you will be able to estimate serving sizes for some foods.  · Take some time to put servings of different foods on your favorite plates, bowls, and cups so you know what a serving looks like.  · Try not to eat straight from a bag or box. Doing this can lead to overeating. Put the amount you would like to eat in a cup or on a plate to make sure you are eating the right portion.  · Use smaller plates, glasses, and bowls to prevent overeating. This is a quick and easy way to practice portion control. If your plate is smaller, less food can fit on it.  · Try not to multitask while eating, such as watching TV or using your computer. If it is time to eat, sit down at a table and enjoy your food. Doing this will help you to start recognizing when you are full. It will also make you more aware of what and how much you are eating.  HOW CAN I CALORIE COUNT WHEN EATING OUT?  · Ask for smaller portion sizes or child-sized portions.  · Consider sharing an entree and sides instead of getting your own entree.  · If you get your own entree, eat only half. Ask for a box at the beginning of your meal and put the rest of your entree in it so you are not tempted to eat it.  · Look for the calories  "on the menu. If calories are listed, choose the lower calorie options.  · Choose dishes that include vegetables, fruits, whole grains, low-fat dairy products, and lean protein. Focusing on smart food choices from each of the 5 food groups can help you stay on track at restaurants.  · Choose items that are boiled, broiled, grilled, or steamed.  · Choose water, milk, unsweetened iced tea, or other drinks without added sugars. If you want an alcoholic beverage, choose a lower calorie option. For example, a regular ryan can have up to 700 calories and a glass of wine has around 150.  · Stay away from items that are buttered, battered, fried, or served with cream sauce. Items labeled \"crispy\" are usually fried, unless stated otherwise.  · Ask for dressings, sauces, and syrups on the side. These are usually very high in calories, so do not eat much of them.  · Watch out for salads. Many people think salads are a healthy option, but this is often not the case. Many salads come with alberts, fried chicken, lots of cheese, fried chips, and dressing. All of these items have a lot of calories. If you want a salad, choose a garden salad and ask for grilled meats or steak. Ask for the dressing on the side, or ask for olive oil and vinegar or lemon to use as dressing.  · Estimate how many servings of a food you are given. For example, a serving of cooked rice is ½ cup or about the size of half a tennis ball or one cupcake wrapper. Knowing serving sizes will help you be aware of how much food you are eating at restaurants. The list below tells you how big or small some common portion sizes are based on everyday objects.  ¨ 1 oz--4 stacked dice.  ¨ 3 oz--1 deck of cards.  ¨ 1 tsp--1 dice.  ¨ 1 Tbsp--½ a Ping-Pong ball.  ¨ 2 Tbsp--1 Ping-Pong ball.  ¨ ½ cup--1 tennis ball or 1 cupcake wrapper.  ¨ 1 cup--1 baseball.     This information is not intended to replace advice given to you by your health care provider. Make sure you " discuss any questions you have with your health care provider.     Document Released: 12/18/2006 Document Revised: 01/08/2016 Document Reviewed: 10/23/2014  [employer] Interactive Patient Education ©2017 [employer]

## 2018-05-02 ENCOUNTER — LAB (OUTPATIENT)
Dept: LAB | Facility: HOSPITAL | Age: 68
End: 2018-05-02

## 2018-05-02 ENCOUNTER — TELEPHONE (OUTPATIENT)
Dept: CARDIOLOGY | Facility: CLINIC | Age: 68
End: 2018-05-02

## 2018-05-02 DIAGNOSIS — Z79.899 DRUG THERAPY: Primary | ICD-10-CM

## 2018-05-02 DIAGNOSIS — I10 ESSENTIAL HYPERTENSION: ICD-10-CM

## 2018-05-02 DIAGNOSIS — E78.5 DYSLIPIDEMIA: ICD-10-CM

## 2018-05-02 LAB
ANION GAP SERPL CALCULATED.3IONS-SCNC: 7.5 MMOL/L (ref 3.6–11.2)
BUN BLD-MCNC: 30 MG/DL (ref 7–21)
BUN/CREAT SERPL: 22.7 (ref 7–25)
CALCIUM SPEC-SCNC: 9.7 MG/DL (ref 7.7–10)
CHLORIDE SERPL-SCNC: 112 MMOL/L (ref 99–112)
CO2 SERPL-SCNC: 21.5 MMOL/L (ref 24.3–31.9)
CREAT BLD-MCNC: 1.32 MG/DL (ref 0.43–1.29)
GFR SERPL CREATININE-BSD FRML MDRD: 40 ML/MIN/1.73
GLUCOSE BLD-MCNC: 142 MG/DL (ref 70–110)
OSMOLALITY SERPL CALC.SUM OF ELEC: 289.9 MOSM/KG (ref 273–305)
POTASSIUM BLD-SCNC: 4.9 MMOL/L (ref 3.5–5.3)
SODIUM BLD-SCNC: 141 MMOL/L (ref 135–153)

## 2018-05-02 PROCEDURE — 36415 COLL VENOUS BLD VENIPUNCTURE: CPT

## 2018-05-02 PROCEDURE — 80048 BASIC METABOLIC PNL TOTAL CA: CPT

## 2018-05-07 ENCOUNTER — LAB (OUTPATIENT)
Dept: LAB | Facility: HOSPITAL | Age: 68
End: 2018-05-07

## 2018-05-07 DIAGNOSIS — Z79.899 DRUG THERAPY: ICD-10-CM

## 2018-05-07 LAB
ANION GAP SERPL CALCULATED.3IONS-SCNC: 7.5 MMOL/L (ref 3.6–11.2)
BUN BLD-MCNC: 25 MG/DL (ref 7–21)
BUN/CREAT SERPL: 21.2 (ref 7–25)
CALCIUM SPEC-SCNC: 10.2 MG/DL (ref 7.7–10)
CHLORIDE SERPL-SCNC: 111 MMOL/L (ref 99–112)
CO2 SERPL-SCNC: 23.5 MMOL/L (ref 24.3–31.9)
CREAT BLD-MCNC: 1.18 MG/DL (ref 0.43–1.29)
GFR SERPL CREATININE-BSD FRML MDRD: 46 ML/MIN/1.73
GLUCOSE BLD-MCNC: 96 MG/DL (ref 70–110)
OSMOLALITY SERPL CALC.SUM OF ELEC: 287.4 MOSM/KG (ref 273–305)
POTASSIUM BLD-SCNC: 4.7 MMOL/L (ref 3.5–5.3)
SODIUM BLD-SCNC: 142 MMOL/L (ref 135–153)

## 2018-05-07 PROCEDURE — 80048 BASIC METABOLIC PNL TOTAL CA: CPT

## 2018-05-07 PROCEDURE — 36415 COLL VENOUS BLD VENIPUNCTURE: CPT

## 2018-09-24 ENCOUNTER — OFFICE VISIT (OUTPATIENT)
Dept: CARDIOLOGY | Facility: CLINIC | Age: 68
End: 2018-09-24

## 2018-09-24 VITALS
OXYGEN SATURATION: 99 % | SYSTOLIC BLOOD PRESSURE: 139 MMHG | HEIGHT: 66 IN | BODY MASS INDEX: 31.76 KG/M2 | WEIGHT: 197.6 LBS | RESPIRATION RATE: 16 BRPM | DIASTOLIC BLOOD PRESSURE: 72 MMHG | HEART RATE: 80 BPM

## 2018-09-24 DIAGNOSIS — I10 ESSENTIAL HYPERTENSION: ICD-10-CM

## 2018-09-24 DIAGNOSIS — E11.9 TYPE 2 DIABETES MELLITUS WITHOUT COMPLICATION, WITHOUT LONG-TERM CURRENT USE OF INSULIN (HCC): ICD-10-CM

## 2018-09-24 DIAGNOSIS — E78.5 DYSLIPIDEMIA: ICD-10-CM

## 2018-09-24 DIAGNOSIS — I05.0 MITRAL VALVE STENOSIS, UNSPECIFIED ETIOLOGY: Primary | ICD-10-CM

## 2018-09-24 PROCEDURE — 99213 OFFICE O/P EST LOW 20 MIN: CPT | Performed by: NURSE PRACTITIONER

## 2018-09-24 PROCEDURE — 93000 ELECTROCARDIOGRAM COMPLETE: CPT | Performed by: NURSE PRACTITIONER

## 2018-09-24 NOTE — PROGRESS NOTES
Kreis, Samuel Duane, MD  Melly Wynn  1950 09/24/2018    Patient Active Problem List   Diagnosis   • Mild Mitral stenosis with mild mitral regurgitation with mild pulmonary hypertension.   • Hypertension   • Dyslipidemia   • Type 2 diabetes mellitus (CMS/[facility])   • Moderate obesity       Dear Kreis, Samuel Duane, MD:    Subjective     Chief Complaint   Patient presents with   • Hypertension     6 month follow up   • mild pulmonary hypertension           History of Present Illness:    Melly Wynn is a 67 y.o. female with a history of mild mitral stenosis, hypertension, dyslipidemia, and Diabetes Mellitus Type 2. She presents today for routine cardiology follow up. She reports she has been doing well. Her potassium was elevated at last follow up. Lisinopril dosage was decreased. She reports recent potassium check by her PCP was normal. Her blood pressure has consistently been below 140/90. She denies chest pain, shortness of breath, and dizziness. Overall, reports she has been doing well.          Allergies   Allergen Reactions   • Lorcet [Hydrocodone-Acetaminophen]    • Penicillins    • Sulfa Antibiotics    :      Current Outpatient Prescriptions:   •  aspirin 81 MG chewable tablet, Chew 81 mg daily., Disp: , Rfl:   •  Calcium Carb-Cholecalciferol (CALCIUM PLUS VITAMIN D3) 600-500 MG-UNIT capsule, Take  by mouth 2 (two) times a day., Disp: , Rfl:   •  Cholecalciferol (VITAMIN D3) 2000 UNITS tablet, Take 2,000 Units by mouth 2 (Two) Times a Day., Disp: , Rfl:   •  diltiazem (CARDIZEM) 120 MG tablet, Take 120 mg by mouth daily., Disp: , Rfl:   •  Ferrous Sulfate (IRON) 28 MG tablet, Take 65 mg by mouth Daily., Disp: , Rfl:   •  glyBURIDE (DIABETA) 5 MG tablet, Take 5 mg by mouth 2 (two) times a day., Disp: , Rfl:   •  levothyroxine (SYNTHROID, LEVOTHROID) 88 MCG tablet, Take 88 mcg by mouth daily., Disp: , Rfl:   •  lisinopril (PRINIVIL,ZESTRIL) 20 MG tablet, 20mg AM, 10mg PM., Disp: , Rfl:   •  metFORMIN  "(GLUCOPHAGE) 500 MG tablet, Take 500 mg by mouth 2 (two) times a day with meals., Disp: , Rfl:   •  Omega-3 Fatty Acids (FISH OIL) 1000 MG capsule capsule, Take 1,200 mg by mouth daily with breakfast., Disp: , Rfl:   •  omeprazole (PriLOSEC) 20 MG capsule, Take 20 mg by mouth daily., Disp: , Rfl:   •  rosuvastatin (CRESTOR) 20 MG tablet, Take 20 mg by mouth daily., Disp: , Rfl:   •  topiramate (TOPAMAX) 50 MG tablet, Take 50 mg by mouth 2 (two) times a day., Disp: , Rfl:       The following portions of the patient's history were reviewed and updated as appropriate: allergies, current medications, past family history, past medical history, past social history, past surgical history and problem list.    Social History   Substance Use Topics   • Smoking status: Never Smoker   • Smokeless tobacco: Never Used   • Alcohol use No       Review of Systems   Constitution: Negative for malaise/fatigue.   Cardiovascular: Negative for chest pain, leg swelling, palpitations and syncope.   Respiratory: Negative for cough, shortness of breath and wheezing.    Neurological: Negative for dizziness and light-headedness.       Objective   Vitals:    09/24/18 1327   BP: 139/72   BP Location: Right arm   Pulse: 80   Resp: 16   SpO2: 99%   Weight: 89.6 kg (197 lb 9.6 oz)   Height: 167.6 cm (65.98\")     Body mass index is 31.91 kg/m².        Physical Exam   Constitutional: She is oriented to person, place, and time. She appears well-developed and well-nourished.   HENT:   Head: Normocephalic and atraumatic.   Cardiovascular: Normal rate, regular rhythm and normal heart sounds.  Exam reveals no S3 and no S4.    No murmur heard.  Pulmonary/Chest: Effort normal and breath sounds normal. She has no wheezes. She has no rales.   Abdominal: Soft. Bowel sounds are normal.   Musculoskeletal: She exhibits no edema.   Neurological: She is alert and oriented to person, place, and time.   Skin: Skin is warm and dry.   Psychiatric: She has a normal mood " and affect. Her behavior is normal.       Lab Results   Component Value Date     05/07/2018    K 4.7 05/07/2018     05/07/2018    CO2 23.5 (L) 05/07/2018    BUN 25 (H) 05/07/2018    CREATININE 1.18 05/07/2018    GLUCOSE 96 05/07/2018    CALCIUM 10.2 (H) 05/07/2018    AST 25 04/29/2017    ALT 21 04/29/2017    ALKPHOS 66 04/29/2017    LABIL2 1.9 04/13/2015           ECG 12 Lead  Date/Time: 9/24/2018 1:33 PM  Performed by: KRISHAN COLE  Authorized by: KRISHAN COLE   Comparison: compared with previous ECG   Similar to previous ECG  Rhythm: sinus rhythm  BPM: 79  Comments: Left axis deviation              Assessment/Plan    Diagnosis Plan   1. Mitral valve stenosis, unspecified etiology     2. Essential hypertension     3. Type 2 diabetes mellitus without complication, without long-term current use of insulin (CMS/[facility])     4. Dyslipidemia                  Recommendations:    1. Continue low dose aspirin, diltiazem, lisinopril, and Crestor    2. Follow up in 6 months and PRN.           Return in about 6 months (around 3/24/2019) for Recheck.    As always, I appreciate very much the opportunity to participate in the cardiovascular care of your patients.      With Best Regards,    ANTONIA Ward

## 2018-11-27 ENCOUNTER — OFFICE VISIT (OUTPATIENT)
Dept: RETAIL CLINIC | Facility: CLINIC | Age: 68
End: 2018-11-27

## 2018-11-27 VITALS
DIASTOLIC BLOOD PRESSURE: 80 MMHG | HEART RATE: 90 BPM | RESPIRATION RATE: 18 BRPM | OXYGEN SATURATION: 98 % | TEMPERATURE: 96.5 F | BODY MASS INDEX: 32.04 KG/M2 | WEIGHT: 198.4 LBS | SYSTOLIC BLOOD PRESSURE: 160 MMHG

## 2018-11-27 DIAGNOSIS — Z20.7 EXPOSURE TO SCABIES: ICD-10-CM

## 2018-11-27 DIAGNOSIS — B86 SCABIES: Primary | ICD-10-CM

## 2018-11-27 PROCEDURE — 99203 OFFICE O/P NEW LOW 30 MIN: CPT | Performed by: NURSE PRACTITIONER

## 2018-11-27 RX ORDER — TRIAMCINOLONE ACETONIDE 1 MG/G
CREAM TOPICAL 2 TIMES DAILY
Qty: 80 G | Refills: 0 | Status: SHIPPED | OUTPATIENT
Start: 2018-11-27 | End: 2019-01-28

## 2018-11-27 RX ORDER — PERMETHRIN 50 MG/G
CREAM TOPICAL ONCE
Qty: 60 G | Refills: 0 | Status: SHIPPED | OUTPATIENT
Start: 2018-11-27 | End: 2018-11-27

## 2018-11-27 NOTE — PROGRESS NOTES
Sohan@  [place] is a 67 y.o. female.   Chief Complaint   Patient presents with   • Rash      Rash   This is a new problem. The current episode started in the past 7 days. The rash is diffuse. The rash is characterized by itchiness. Associated with: ill contact with scabies. Pertinent negatives include no congestion, cough, diarrhea, fatigue, fever, rhinorrhea, shortness of breath or sore throat.      Presents with c/o itchy rash on the trunk and legs. She has had ill contact with close family member who has diagnosis of scabies. Has used Benadryl for the itching with mild relief obtained.    The following portions of the patient's history were reviewed and updated as appropriate: allergies, current medications, past family history, past medical history, past social history, past surgical history and problem list.    Current Outpatient Medications:   •  aspirin 81 MG chewable tablet, Chew 81 mg daily., Disp: , Rfl:   •  Calcium Carb-Cholecalciferol (CALCIUM PLUS VITAMIN D3) 600-500 MG-UNIT capsule, Take  by mouth 2 (two) times a day., Disp: , Rfl:   •  Cholecalciferol (VITAMIN D3) 2000 UNITS tablet, Take 2,000 Units by mouth 2 (Two) Times a Day., Disp: , Rfl:   •  diltiazem (CARDIZEM) 120 MG tablet, Take 120 mg by mouth daily., Disp: , Rfl:   •  Ferrous Sulfate (IRON) 28 MG tablet, Take 65 mg by mouth Daily., Disp: , Rfl:   •  glyBURIDE (DIABETA) 5 MG tablet, Take 5 mg by mouth 2 (two) times a day., Disp: , Rfl:   •  levothyroxine (SYNTHROID, LEVOTHROID) 88 MCG tablet, Take 88 mcg by mouth daily., Disp: , Rfl:   •  lisinopril (PRINIVIL,ZESTRIL) 20 MG tablet, 20mg AM, 10mg PM., Disp: , Rfl:   •  metFORMIN (GLUCOPHAGE) 500 MG tablet, Take 500 mg by mouth 2 (two) times a day with meals., Disp: , Rfl:   •  Omega-3 Fatty Acids (FISH OIL) 1000 MG capsule capsule, Take 1,200 mg by mouth daily with breakfast., Disp: , Rfl:   •  omeprazole (PriLOSEC) 20 MG capsule, Take 20 mg by mouth daily., Disp: , Rfl:    •  rosuvastatin (CRESTOR) 20 MG tablet, Take 20 mg by mouth daily., Disp: , Rfl:   •  topiramate (TOPAMAX) 50 MG tablet, Take 50 mg by mouth 2 (two) times a day., Disp: , Rfl:   •  permethrin (ELIMITE) 5 % cream, Apply  topically to the appropriate area as directed 1 (One) Time for 1 dose. May repeat in 10-14 days, Disp: 60 g, Rfl: 0  •  triamcinolone (KENALOG) 0.1 % cream, Apply  topically to the appropriate area as directed 2 (Two) Times a Day., Disp: 80 g, Rfl: 0    Allergies   Allergen Reactions   • Lorcet [Hydrocodone-Acetaminophen] GI Intolerance     Extreme Vomiting   • Penicillins Rash     Rash with blisters   • Sulfa Antibiotics Rash     Blisters       Review of Systems   Constitutional: Negative for activity change, chills, fatigue and fever.   HENT: Negative for congestion, postnasal drip, rhinorrhea and sore throat.    Eyes: Negative.  Negative for discharge and itching.   Respiratory: Negative for cough, shortness of breath and wheezing.    Gastrointestinal: Negative for diarrhea, nausea and rectal pain.   Endocrine: Negative.    Musculoskeletal: Negative for neck pain and neck stiffness.   Skin: Positive for rash. Negative for color change and pallor.   Neurological: Negative for headaches.   Psychiatric/Behavioral: Positive for sleep disturbance.     Objective        Visit Vitals  /80   Pulse 90   Temp 96.5 °F (35.8 °C) (Temporal)   Resp 18   Wt 90 kg (198 lb 6.4 oz)   SpO2 98%   BMI 32.04 kg/m²       Physical Exam   Constitutional: She is oriented to person, place, and time. She appears well-developed and well-nourished.   Eyes: Conjunctivae are normal. Pupils are equal, round, and reactive to light.   Cardiovascular: Normal rate and regular rhythm.   Pulmonary/Chest: Effort normal and breath sounds normal. She has no wheezes.   Abdominal: Soft. Bowel sounds are normal.   Lymphadenopathy:     She has no cervical adenopathy.   Neurological: She is alert and oriented to person, place, and time.    Skin: Skin is warm. Rash noted.   Erythema Maculopapular rash scattered on the trunk and lower extremities. Negative for drainage or induration.    Psychiatric: She has a normal mood and affect. Her behavior is normal.     Lab Results (last 24 hours)     ** No results found for the last 24 hours. **        Assessment/Plan   Melly was seen today for rash.    Diagnoses and all orders for this visit:    Scabies  -     permethrin (ELIMITE) 5 % cream; Apply  topically to the appropriate area as directed 1 (One) Time for 1 dose. May repeat in 10-14 days  -     triamcinolone (KENALOG) 0.1 % cream; Apply  topically to the appropriate area as directed 2 (Two) Times a Day.    Exposure to scabies               Discussed PE findings and treatment plan. AVS reviewed with patient, understanding verbalized and agrees with treatment plan.  Follow up with primary care provider if no improvement within next 7-10 days. Go to [facility] or ER if condition worsens.

## 2018-11-27 NOTE — PATIENT INSTRUCTIONS
Scabies, Adult  Scabies is a skin condition that happens when very small insects get under the skin (infestation). This causes a rash and severe itchiness. Scabies can spread from person to person (is contagious). If you get scabies, it is common for others in your household to get scabies too.  With proper treatment, symptoms usually go away in 2-4 weeks. Scabies usually does not cause lasting problems.  What are the causes?  This condition is caused by mites (Sarcoptes scabiei, or human itch mites) that can only be seen with a microscope. The mites get into the top layer of skin and lay eggs. Scabies can spread from person to person through:  · Close contact with a person who has scabies.  · Contact with infested items, such as towels, bedding, or clothing.    What increases the risk?  This condition is more likely to develop in:  · People who live in nursing homes and other extended-care facilities.  · People who have sexual contact with a partner who has scabies.  · Young children who attend  facilities.  · People who care for others who are at increased risk for scabies.    What are the signs or symptoms?  Symptoms of this condition may include:  · Severe itchiness. This is often worse at night.  · A rash that includes tiny red bumps or blisters. The rash commonly occurs on the wrist, elbow, armpit, fingers, waist, groin, or buttocks. Bumps may form a line (burrow) in some areas.  · Skin irritation. This can include scaly patches or sores.    How is this diagnosed?  This condition is diagnosed with a physical exam. Your health care provider will look closely at your skin. In some cases, your health care provider may take a sample of your affected skin (skin scraping) and have it examined under a microscope.  How is this treated?  This condition may be treated with:  · Medicated cream or lotion that kills the mites. This is spread on the entire body and left on for several hours. Usually, one treatment  with medicated cream or lotion is enough to kill all of the mites. In severe cases, the treatment may be repeated.  · Medicated cream that relieves itching.  · Medicines that help to relieve itching.  · Medicines that kill the mites. This treatment is rarely used.    Follow these instructions at home:    Medicines  · Take or apply over-the-counter and prescription medicines as told by your health care provider.  · Apply medicated cream or lotion as told by your health care provider.  · Do not wash off the medicated cream or lotion until the necessary amount of time has passed.  Skin Care  · Avoid scratching your affected skin.  · Keep your fingernails closely trimmed to reduce injury from scratching.  · Take cool baths or apply cool washcloths to help reduce itching.  General instructions  · Clean all items that you recently had contact with, including bedding, clothing, and furniture. Do this on the same day that your treatment starts.  ? Use hot water when you wash items.  ? Place unwashable items into closed, airtight plastic bags for at least 3 days. The mites cannot live for more than 3 days away from human skin.  ? Vacuum furniture and mattresses that you use.  · Make sure that other people who may have been infested are examined by a health care provider. These include members of your household and anyone who may have had contact with infested items.  · Keep all follow-up visits as told by your health care provider. This is important.  Contact a health care provider if:  · You have itching that does not go away after 4 weeks of treatment.  · You continue to develop new bumps or burrows.  · You have redness, swelling, or pain in your rash area after treatment.  · You have fluid, blood, or pus coming from your rash.  This information is not intended to replace advice given to you by your health care provider. Make sure you discuss any questions you have with your health care provider.  Document Released:  09/07/2016 Document Revised: 05/25/2017 Document Reviewed: 07/19/2016  Elsevier Interactive Patient Education © 2018 [employer]

## 2019-01-28 ENCOUNTER — OFFICE VISIT (OUTPATIENT)
Dept: RETAIL CLINIC | Facility: CLINIC | Age: 69
End: 2019-01-28

## 2019-01-28 DIAGNOSIS — Z20.7 EXPOSURE TO SCABIES: Primary | ICD-10-CM

## 2019-01-28 DIAGNOSIS — R21 RASH: ICD-10-CM

## 2019-01-28 PROCEDURE — 99213 OFFICE O/P EST LOW 20 MIN: CPT | Performed by: NURSE PRACTITIONER

## 2019-01-28 RX ORDER — PERMETHRIN 50 MG/G
CREAM TOPICAL ONCE
Qty: 120 G | Refills: 1 | Status: SHIPPED | OUTPATIENT
Start: 2019-01-28 | End: 2019-01-29 | Stop reason: SDUPTHER

## 2019-01-28 RX ORDER — NYSTATIN 100000 [USP'U]/G
POWDER TOPICAL 3 TIMES DAILY
Qty: 60 G | Refills: 0 | Status: SHIPPED | OUTPATIENT
Start: 2019-01-28 | End: 2019-10-25 | Stop reason: ALTCHOICE

## 2019-01-28 NOTE — PROGRESS NOTES
Melly presents to the clinic today c/o a rash which started appx one week ago. Associated symptoms include erythematous lesions which are pruritic especially at night and diffuse located on located on her trunk and legs. She has been in contact with an individual with scabies. She has tried Benadryl and a steroid cream without adequate relief.   Melly also is c/o an area on her chest located between her breasts which is red.      Rash   This is a recurrent problem. The current episode started in the past 7 days. The problem has been gradually worsening since onset. The rash is diffuse. The rash is characterized by redness and itchiness. She was exposed to an ill contact (Scabies). Pertinent negatives include no anorexia, congestion, cough, facial edema, fatigue, fever, rhinorrhea, shortness of breath, sore throat or vomiting. Past treatments include anti-itch cream and antihistamine. The treatment provided no relief. Her past medical history is significant for allergies. There is no history of asthma or eczema.      Refer to ROS for additional information.  Vitals:    01/28/19 1702   BP: 148/80   Pulse: 88   Resp: 14   Temp: 98.4 °F (36.9 °C)   SpO2: 99%     The following portions of the patient's history were reviewed and updated as appropriate: allergies, current medications, past family history, past medical history, past social history, past surgical history and problem list.    Review of Systems   Constitutional: Negative for activity change, appetite change, chills, fatigue and fever.   HENT: Negative for congestion, rhinorrhea, sinus pressure and sore throat.    Respiratory: Negative for cough, shortness of breath and wheezing.    Cardiovascular: Negative for chest pain.   Gastrointestinal: Negative for anorexia, nausea and vomiting.   Skin: Positive for rash. Negative for color change.   Hematological: Negative for adenopathy.     Physical Exam   Constitutional: She is oriented to person, place, and  time. She appears well-developed and well-nourished. No distress.   HENT:   Head: Normocephalic.   Right Ear: Tympanic membrane and ear canal normal.   Left Ear: Tympanic membrane and ear canal normal.   Nose: Nose normal.   Mouth/Throat: Oropharynx is clear and moist. No oropharyngeal exudate.   Eyes: Conjunctivae are normal. Pupils are equal, round, and reactive to light. Right eye exhibits no discharge. Left eye exhibits no discharge. No scleral icterus.   Neck: Neck supple. No tracheal tenderness present.   Cardiovascular: Normal rate, regular rhythm and normal heart sounds. Exam reveals no friction rub.   No murmur heard.  Pulmonary/Chest: Effort normal and breath sounds normal. No respiratory distress. She has no wheezes. She has no rales.   Lymphadenopathy:     She has no cervical adenopathy.   Neurological: She is alert and oriented to person, place, and time.   Skin: Skin is warm and dry. Capillary refill takes less than 2 seconds. Rash noted.   Erythematous  maculopapular rash scattered on the trunk and lower extremities. Negative for drainage or induration.    There is another area located at the upper region between her breasts which is fiery red and appears candidiasis like   Vitals reviewed.    Assessment/Plan   Problems Addressed this Visit     None      Visit Diagnoses     Exposure to scabies    -  Primary    Relevant Medications    permethrin (ELIMITE) 5 % cream    Rash        Relevant Medications    nystatin (MYCOSTATIN) 300735 UNIT/GM powder    permethrin (ELIMITE) 5 % cream        Findings and recommendations discussed with Melly. Treatment options reviewed. Counseled Melly regarding the Permethrin 1% cream and to repeat in one week. Also, discussed the area in between her breasts and to apply Nystatin Powder to the area and maintain glycemic control. Encouraged her to seek further medical evaluation do not if symptoms worsen or do not improve.

## 2019-01-29 VITALS
OXYGEN SATURATION: 99 % | RESPIRATION RATE: 14 BRPM | BODY MASS INDEX: 32.22 KG/M2 | SYSTOLIC BLOOD PRESSURE: 148 MMHG | WEIGHT: 193.4 LBS | HEART RATE: 88 BPM | HEIGHT: 65 IN | TEMPERATURE: 98.4 F | DIASTOLIC BLOOD PRESSURE: 80 MMHG

## 2019-01-29 RX ORDER — PERMETHRIN 50 MG/G
CREAM TOPICAL ONCE
Qty: 120 G | Refills: 1
Start: 2019-01-29 | End: 2019-01-29

## 2019-03-28 ENCOUNTER — OFFICE VISIT (OUTPATIENT)
Dept: CARDIOLOGY | Facility: CLINIC | Age: 69
End: 2019-03-28

## 2019-03-28 VITALS
HEART RATE: 64 BPM | BODY MASS INDEX: 30.22 KG/M2 | SYSTOLIC BLOOD PRESSURE: 114 MMHG | HEIGHT: 66 IN | WEIGHT: 188 LBS | DIASTOLIC BLOOD PRESSURE: 66 MMHG

## 2019-03-28 DIAGNOSIS — I10 ESSENTIAL HYPERTENSION: ICD-10-CM

## 2019-03-28 DIAGNOSIS — E78.5 DYSLIPIDEMIA: ICD-10-CM

## 2019-03-28 DIAGNOSIS — I05.0 MITRAL VALVE STENOSIS, UNSPECIFIED ETIOLOGY: Primary | ICD-10-CM

## 2019-03-28 DIAGNOSIS — I21.4 NON-ST ELEVATION MYOCARDIAL INFARCTION (NSTEMI) (HCC): ICD-10-CM

## 2019-03-28 PROCEDURE — 93000 ELECTROCARDIOGRAM COMPLETE: CPT | Performed by: NURSE PRACTITIONER

## 2019-03-28 PROCEDURE — 99213 OFFICE O/P EST LOW 20 MIN: CPT | Performed by: NURSE PRACTITIONER

## 2019-03-28 NOTE — PROGRESS NOTES
Kreis, Samuel Duane, MD  Melly Wynn  1950 03/28/2019    Patient Active Problem List   Diagnosis   • Mild Mitral stenosis with mild mitral regurgitation with mild pulmonary hypertension.   • Hypertension   • Dyslipidemia   • Type 2 diabetes mellitus (CMS/HCC)   • Moderate obesity       Dear Kreis, Samuel Duane, MD:    Subjective     Chief Complaint   Patient presents with   • Mitral Stenosis/Mitral Regurgitation     follow up   • Med Management     list presented           History of Present Illness:    Melly Wynn is a 68 y.o. female with a past medical history significant for mild mitral stenosis, hypertension, dyslipidemia, and diabetes mellitus type 2.  She presents today for routine cardiology follow-up.  Reports she has been doing well.The patient denies chest pain, shortness of breath, palpitations, dizziness, lightheadedness, near syncope, and syncope.  Her blood pressure is well controlled in the office today.  Home readings have been consistent. She is tolerating medications well.  She reports she has had recent labs ordered by her PCP.    Allergies   Allergen Reactions   • Lorcet [Hydrocodone-Acetaminophen] GI Intolerance     Extreme Vomiting   • Penicillins Rash     Rash with blisters   • Sulfa Antibiotics Rash     Blisters     :      Current Outpatient Medications:   •  aspirin 81 MG chewable tablet, Chew 81 mg daily., Disp: , Rfl:   •  Cholecalciferol (VITAMIN D3) 2000 UNITS tablet, Take 2,000 Units by mouth 2 (Two) Times a Day., Disp: , Rfl:   •  diltiazem (CARDIZEM) 120 MG tablet, Take 120 mg by mouth daily., Disp: , Rfl:   •  Ferrous Sulfate (IRON) 28 MG tablet, Take 65 mg by mouth Daily., Disp: , Rfl:   •  glyBURIDE (DIABETA) 5 MG tablet, Take 5 mg by mouth 2 (two) times a day., Disp: , Rfl:   •  levothyroxine (SYNTHROID, LEVOTHROID) 88 MCG tablet, Take 88 mcg by mouth daily., Disp: , Rfl:   •  lisinopril (PRINIVIL,ZESTRIL) 20 MG tablet, 20mg AM, 10mg PM., Disp: , Rfl:   •  metFORMIN  "(GLUCOPHAGE) 1000 MG tablet, Take 1,000 mg by mouth 2 (Two) Times a Day With Meals., Disp: , Rfl:   •  Omega-3 Fatty Acids (FISH OIL) 1000 MG capsule capsule, Take 1,200 mg by mouth daily with breakfast., Disp: , Rfl:   •  omeprazole (PriLOSEC) 20 MG capsule, Take 20 mg by mouth daily., Disp: , Rfl:   •  rosuvastatin (CRESTOR) 20 MG tablet, Take 20 mg by mouth daily., Disp: , Rfl:   •  topiramate (TOPAMAX) 50 MG tablet, Take 50 mg by mouth 2 (two) times a day., Disp: , Rfl:   •  Calcium Carb-Cholecalciferol (CALCIUM PLUS VITAMIN D3) 600-500 MG-UNIT capsule, Take  by mouth 2 (two) times a day., Disp: , Rfl:   •  nystatin (MYCOSTATIN) 693100 UNIT/GM powder, Apply  topically to the appropriate area as directed 3 (Three) Times a Day., Disp: 60 g, Rfl: 0      The following portions of the patient's history were reviewed and updated as appropriate: allergies, current medications, past family history, past medical history, past social history, past surgical history and problem list.    Social History     Tobacco Use   • Smoking status: Never Smoker   • Smokeless tobacco: Never Used   Substance Use Topics   • Alcohol use: No   • Drug use: No       Review of Systems   Constitution: Negative for weakness and malaise/fatigue.   Cardiovascular: Negative for chest pain, dyspnea on exertion, irregular heartbeat, leg swelling, near-syncope, orthopnea, palpitations, paroxysmal nocturnal dyspnea and syncope.   Respiratory: Negative for cough, shortness of breath and wheezing.    Neurological: Negative for dizziness and light-headedness.       Objective   Vitals:    03/28/19 0944   BP: 114/66   BP Location: Right arm   Patient Position: Sitting   Cuff Size: Adult   Pulse: 64   Weight: 85.3 kg (188 lb)   Height: 167.6 cm (66\")     Body mass index is 30.34 kg/m².        Physical Exam   Constitutional: She is oriented to person, place, and time. She appears well-developed and well-nourished.   HENT:   Head: Normocephalic and atraumatic. "   Neck: No JVD present.   Cardiovascular: Normal rate, regular rhythm and normal heart sounds. Exam reveals no S3 and no S4.   No murmur heard.  Pulmonary/Chest: Effort normal and breath sounds normal. She has no wheezes. She has no rales.   Abdominal: Soft. Bowel sounds are normal.   Musculoskeletal: She exhibits no edema.   Neurological: She is alert and oriented to person, place, and time.   Skin: Skin is warm and dry.   Psychiatric: She has a normal mood and affect. Her behavior is normal.               ECG 12 Lead  Date/Time: 3/28/2019 9:42 AM  Performed by: Kerry Bass CMA  Authorized by: Anita Mata APRN   Comparison: compared with previous ECG   Rhythm: sinus rhythm  BPM: 63  Conduction: incomplete right bundle branch block              Assessment/Plan    Diagnosis Plan   1. Mitral valve stenosis, unspecified etiology  ECG 12 Lead   2. Essential hypertension  ECG 12 Lead   3. Dyslipidemia  ECG 12 Lead   4. Non-ST elevation myocardial infarction (NSTEMI) (CMS/[facility])  ECG 12 Lead                Recommendations:    1. Continue low dose aspirin, diltiazem, lisinopril, and Crestor.    2. Follow up in 6 months and PRN.      Return in about 6 months (around 9/28/2019).    As always, I appreciate very much the opportunity to participate in the cardiovascular care of your patients.      With Best Regards,    ANTONIA Ward

## 2019-05-10 ENCOUNTER — HOSPITAL ENCOUNTER (OUTPATIENT)
Dept: MAMMOGRAPHY | Facility: HOSPITAL | Age: 69
Discharge: HOME | End: 2019-05-10
Admitting: FAMILY MEDICINE

## 2019-05-10 DIAGNOSIS — Z12.39 SCREENING BREAST EXAMINATION: ICD-10-CM

## 2019-05-10 PROCEDURE — 77067 SCR MAMMO BI INCL CAD: CPT

## 2019-05-10 PROCEDURE — 77067 SCR MAMMO BI INCL CAD: CPT | Performed by: RADIOLOGY

## 2019-05-10 PROCEDURE — 77063 BREAST TOMOSYNTHESIS BI: CPT

## 2019-05-10 PROCEDURE — 77063 BREAST TOMOSYNTHESIS BI: CPT | Performed by: RADIOLOGY

## 2019-08-12 ENCOUNTER — TRANSCRIBE ORDERS (OUTPATIENT)
Dept: ADMINISTRATIVE | Facility: HOSPITAL | Age: 69
End: 2019-08-12

## 2019-08-12 DIAGNOSIS — S37.001A INJURY OF RIGHT KIDNEY, INITIAL ENCOUNTER: Primary | ICD-10-CM

## 2019-08-16 ENCOUNTER — HOSPITAL ENCOUNTER (OUTPATIENT)
Dept: ULTRASOUND IMAGING | Facility: HOSPITAL | Age: 69
Discharge: HOME | End: 2019-08-16
Admitting: FAMILY MEDICINE

## 2019-08-16 DIAGNOSIS — S37.001A INJURY OF RIGHT KIDNEY, INITIAL ENCOUNTER: ICD-10-CM

## 2019-08-16 PROCEDURE — 76775 US EXAM ABDO BACK WALL LIM: CPT

## 2019-08-16 PROCEDURE — 76775 US EXAM ABDO BACK WALL LIM: CPT | Performed by: RADIOLOGY

## 2019-08-29 ENCOUNTER — LAB (OUTPATIENT)
Dept: LAB | Facility: HOSPITAL | Age: 69
End: 2019-08-29

## 2019-08-29 ENCOUNTER — TRANSCRIBE ORDERS (OUTPATIENT)
Dept: OTHER | Facility: OTHER | Age: 69
End: 2019-08-29

## 2019-08-29 DIAGNOSIS — E87.5 HYPERPOTASSEMIA: Primary | ICD-10-CM

## 2019-08-29 DIAGNOSIS — E87.5 HYPERPOTASSEMIA: ICD-10-CM

## 2019-08-29 LAB
ANION GAP SERPL CALCULATED.3IONS-SCNC: 11.4 MMOL/L (ref 5–15)
BUN BLD-MCNC: 30 MG/DL (ref 8–23)
BUN/CREAT SERPL: 23.8 (ref 7–25)
CALCIUM SPEC-SCNC: 10.1 MG/DL (ref 8.6–10.5)
CHLORIDE SERPL-SCNC: 110 MMOL/L (ref 98–107)
CO2 SERPL-SCNC: 20.6 MMOL/L (ref 22–29)
CREAT BLD-MCNC: 1.26 MG/DL (ref 0.57–1)
GFR SERPL CREATININE-BSD FRML MDRD: 42 ML/MIN/1.73
GLUCOSE BLD-MCNC: 124 MG/DL (ref 65–99)
POTASSIUM BLD-SCNC: 5.1 MMOL/L (ref 3.5–5.2)
SODIUM BLD-SCNC: 142 MMOL/L (ref 136–145)

## 2019-08-29 PROCEDURE — 36415 COLL VENOUS BLD VENIPUNCTURE: CPT

## 2019-08-29 PROCEDURE — 80048 BASIC METABOLIC PNL TOTAL CA: CPT

## 2019-09-23 ENCOUNTER — TRANSCRIBE ORDERS (OUTPATIENT)
Dept: ADMINISTRATIVE | Facility: HOSPITAL | Age: 69
End: 2019-09-23

## 2019-09-23 ENCOUNTER — APPOINTMENT (OUTPATIENT)
Dept: LAB | Facility: HOSPITAL | Age: 69
End: 2019-09-23

## 2019-09-23 DIAGNOSIS — E87.5 HYPERKALEMIA: Primary | ICD-10-CM

## 2019-09-23 LAB
ANION GAP SERPL CALCULATED.3IONS-SCNC: 11.9 MMOL/L (ref 5–15)
BUN BLD-MCNC: 31 MG/DL (ref 8–23)
BUN/CREAT SERPL: 25.4 (ref 7–25)
CALCIUM SPEC-SCNC: 10 MG/DL (ref 8.6–10.5)
CHLORIDE SERPL-SCNC: 106 MMOL/L (ref 98–107)
CO2 SERPL-SCNC: 21.1 MMOL/L (ref 22–29)
CREAT BLD-MCNC: 1.22 MG/DL (ref 0.57–1)
GFR SERPL CREATININE-BSD FRML MDRD: 44 ML/MIN/1.73
GLUCOSE BLD-MCNC: 135 MG/DL (ref 65–99)
POTASSIUM BLD-SCNC: 5 MMOL/L (ref 3.5–5.2)
SODIUM BLD-SCNC: 139 MMOL/L (ref 136–145)

## 2019-09-23 PROCEDURE — 80048 BASIC METABOLIC PNL TOTAL CA: CPT | Performed by: FAMILY MEDICINE

## 2019-09-23 PROCEDURE — 36415 COLL VENOUS BLD VENIPUNCTURE: CPT | Performed by: FAMILY MEDICINE

## 2019-10-25 ENCOUNTER — OFFICE VISIT (OUTPATIENT)
Dept: CARDIOLOGY | Facility: CLINIC | Age: 69
End: 2019-10-25

## 2019-10-25 VITALS
HEIGHT: 66 IN | DIASTOLIC BLOOD PRESSURE: 72 MMHG | WEIGHT: 178.8 LBS | OXYGEN SATURATION: 100 % | HEART RATE: 62 BPM | BODY MASS INDEX: 28.73 KG/M2 | SYSTOLIC BLOOD PRESSURE: 132 MMHG

## 2019-10-25 DIAGNOSIS — E11.9 TYPE 2 DIABETES MELLITUS WITHOUT COMPLICATION, WITHOUT LONG-TERM CURRENT USE OF INSULIN (HCC): ICD-10-CM

## 2019-10-25 DIAGNOSIS — I10 ESSENTIAL HYPERTENSION: Primary | ICD-10-CM

## 2019-10-25 DIAGNOSIS — I05.0 MITRAL VALVE STENOSIS, UNSPECIFIED ETIOLOGY: ICD-10-CM

## 2019-10-25 DIAGNOSIS — I25.2 HISTORY OF NON-ST ELEVATION MYOCARDIAL INFARCTION (NSTEMI): ICD-10-CM

## 2019-10-25 DIAGNOSIS — E78.5 DYSLIPIDEMIA: ICD-10-CM

## 2019-10-25 PROCEDURE — 99213 OFFICE O/P EST LOW 20 MIN: CPT | Performed by: NURSE PRACTITIONER

## 2019-10-25 PROCEDURE — 93000 ELECTROCARDIOGRAM COMPLETE: CPT | Performed by: NURSE PRACTITIONER

## 2019-10-25 NOTE — PROGRESS NOTES
Kreis, Samuel Duane, MD  Melly Wynn  1950  10/25/2019    Patient Active Problem List   Diagnosis   • Mild Mitral stenosis with mild mitral regurgitation with mild pulmonary hypertension.   • Hypertension   • Dyslipidemia   • Type 2 diabetes mellitus (CMS/HCC)   • Moderate obesity   • History of non-ST elevation myocardial infarction (NSTEMI)       Dear Kreis, Samuel Duane, MD:    Subjective     Chief Complaint   Patient presents with   • Follow-up   • Med Management     med list.            History of Present Illness:    Melly Wynn is a 68 y.o. female with a past medical history significant for mild mitral valve stenosis, hypertension, dyslipidemia, diabetes mellitus type 2.  She presents today for routine cardiology follow-up.  She denies any chest pain, shortness of breath, palpitations, dizziness, or lightheadedness.  She has lost weight since her last visit here by changing her diet.  She reports since her last visit, she was diagnosed with chronic kidney disease.  Her PCP has been adjusting her medications accordingly.  Her blood pressure has been well controlled.        Allergies   Allergen Reactions   • Lorcet [Hydrocodone-Acetaminophen] GI Intolerance     Extreme Vomiting   • Penicillins Rash     Rash with blisters   • Sulfa Antibiotics Rash     Blisters     :      Current Outpatient Medications:   •  aspirin 81 MG chewable tablet, Chew 81 mg daily., Disp: , Rfl:   •  Cholecalciferol (VITAMIN D3) 2000 UNITS tablet, Take 2,000 Units by mouth 2 (Two) Times a Day., Disp: , Rfl:   •  diltiazem (CARDIZEM) 120 MG tablet, Take 120 mg by mouth daily., Disp: , Rfl:   •  Ferrous Sulfate (IRON) 28 MG tablet, Take 65 mg by mouth Daily., Disp: , Rfl:   •  glyBURIDE (DIABETA) 5 MG tablet, Take 5 mg by mouth 2 (two) times a day., Disp: , Rfl:   •  levothyroxine (SYNTHROID, LEVOTHROID) 88 MCG tablet, Take 88 mcg by mouth daily., Disp: , Rfl:   •  lisinopril (PRINIVIL,ZESTRIL) 20 MG tablet, 20mg AM, 10mg PM.,  "Disp: , Rfl:   •  Omega-3 Fatty Acids (FISH OIL) 1000 MG capsule capsule, Take 1,200 mg by mouth daily with breakfast., Disp: , Rfl:   •  rosuvastatin (CRESTOR) 20 MG tablet, Take 20 mg by mouth daily., Disp: , Rfl:   •  topiramate (TOPAMAX) 50 MG tablet, Take 50 mg by mouth 2 (two) times a day., Disp: , Rfl:       The following portions of the patient's history were reviewed and updated as appropriate: allergies, current medications, past family history, past medical history, past social history, past surgical history and problem list.    Social History     Tobacco Use   • Smoking status: Never Smoker   • Smokeless tobacco: Never Used   Substance Use Topics   • Alcohol use: No   • Drug use: No       Review of Systems   Constitution: Negative for weakness and malaise/fatigue.   Cardiovascular: Negative for chest pain, dyspnea on exertion, irregular heartbeat, leg swelling, near-syncope, orthopnea, palpitations, paroxysmal nocturnal dyspnea and syncope.   Respiratory: Negative for cough, shortness of breath and wheezing.    Neurological: Negative for dizziness and light-headedness.       Objective   Vitals:    10/25/19 1220   BP: 132/72   BP Location: Left arm   Patient Position: Sitting   Cuff Size: Adult   Pulse: 62   SpO2: 100%   Weight: 81.1 kg (178 lb 12.8 oz)   Height: 167.6 cm (66\")     Body mass index is 28.86 kg/m².        Physical Exam   Constitutional: She is oriented to person, place, and time. She appears well-developed and well-nourished.   HENT:   Head: Normocephalic and atraumatic.   Cardiovascular: Normal rate, regular rhythm and normal heart sounds. Exam reveals no S3 and no S4.   No murmur heard.  Pulmonary/Chest: Effort normal and breath sounds normal. She has no wheezes. She has no rales.   Abdominal: Soft. Bowel sounds are normal.   Musculoskeletal: She exhibits no edema.   Neurological: She is alert and oriented to person, place, and time.   Skin: Skin is warm and dry.   Psychiatric: She has a " normal mood and affect. Her behavior is normal.               ECG 12 Lead  Date/Time: 10/25/2019 12:12 PM  Performed by: Anita Mata APRN  Authorized by: Anita Mata APRN   Comparison: compared with previous ECG   Similar to previous ECG  Rhythm: sinus rhythm  BPM: 57  Conduction: incomplete right bundle branch block              Assessment/Plan    Diagnosis Plan   1. Essential hypertension  ECG 12 Lead   2. Mitral valve stenosis, unspecified etiology  ECG 12 Lead   3. Type 2 diabetes mellitus without complication, without long-term current use of insulin (CMS/[facility])  ECG 12 Lead   4. Dyslipidemia  ECG 12 Lead   5. History of non-ST elevation myocardial infarction (NSTEMI)  ECG 12 Lead                Recommendations:    1.  Continue low-dose aspirin, Crestor, and diltiazem.    2.  Follow-up in 6 months and if needed.       Return in about 6 months (around 4/25/2020) for Recheck.    As always, I appreciate very much the opportunity to participate in the cardiovascular care of your patients.      With Best Regards,    ANTONIA Ward

## 2020-05-28 ENCOUNTER — TRANSCRIBE ORDERS (OUTPATIENT)
Dept: ADMINISTRATIVE | Facility: HOSPITAL | Age: 70
End: 2020-05-28

## 2020-05-28 DIAGNOSIS — G45.9 TIA (TRANSIENT ISCHEMIC ATTACK): Primary | ICD-10-CM

## 2020-05-29 ENCOUNTER — TRANSCRIBE ORDERS (OUTPATIENT)
Dept: ADMINISTRATIVE | Facility: HOSPITAL | Age: 70
End: 2020-05-29

## 2020-05-29 DIAGNOSIS — H53.40 VISUAL FIELD DEFECT: Primary | ICD-10-CM

## 2020-05-29 DIAGNOSIS — G45.9 TIA (TRANSIENT ISCHEMIC ATTACK): ICD-10-CM

## 2020-07-17 ENCOUNTER — OFFICE VISIT (OUTPATIENT)
Dept: CARDIOLOGY | Facility: CLINIC | Age: 70
End: 2020-07-17

## 2020-07-17 ENCOUNTER — APPOINTMENT (OUTPATIENT)
Dept: MAMMOGRAPHY | Facility: HOSPITAL | Age: 70
End: 2020-07-17

## 2020-07-17 ENCOUNTER — APPOINTMENT (OUTPATIENT)
Dept: BONE DENSITY | Facility: HOSPITAL | Age: 70
End: 2020-07-17

## 2020-07-17 VITALS
SYSTOLIC BLOOD PRESSURE: 130 MMHG | DIASTOLIC BLOOD PRESSURE: 60 MMHG | WEIGHT: 186.8 LBS | RESPIRATION RATE: 16 BRPM | OXYGEN SATURATION: 99 % | HEART RATE: 84 BPM | TEMPERATURE: 99.1 F | BODY MASS INDEX: 30.02 KG/M2 | HEIGHT: 66 IN

## 2020-07-17 DIAGNOSIS — E78.5 DYSLIPIDEMIA: ICD-10-CM

## 2020-07-17 DIAGNOSIS — I25.2 HISTORY OF NON-ST ELEVATION MYOCARDIAL INFARCTION (NSTEMI): ICD-10-CM

## 2020-07-17 DIAGNOSIS — E11.9 TYPE 2 DIABETES MELLITUS WITHOUT COMPLICATION, WITHOUT LONG-TERM CURRENT USE OF INSULIN (HCC): ICD-10-CM

## 2020-07-17 DIAGNOSIS — I05.0 MITRAL VALVE STENOSIS, UNSPECIFIED ETIOLOGY: ICD-10-CM

## 2020-07-17 DIAGNOSIS — I10 ESSENTIAL HYPERTENSION: Primary | ICD-10-CM

## 2020-07-17 PROCEDURE — 99213 OFFICE O/P EST LOW 20 MIN: CPT | Performed by: NURSE PRACTITIONER

## 2020-07-17 PROCEDURE — 93000 ELECTROCARDIOGRAM COMPLETE: CPT | Performed by: NURSE PRACTITIONER

## 2020-07-17 RX ORDER — ALLOPURINOL 100 MG/1
TABLET ORAL
COMMUNITY
Start: 2020-06-17

## 2020-07-17 RX ORDER — CLOPIDOGREL BISULFATE 75 MG/1
TABLET ORAL
COMMUNITY
Start: 2020-05-27

## 2020-07-17 NOTE — PROGRESS NOTES
Kreis, Samuel Duane, MD  Melly Wynn  1950 07/17/2020    Patient Active Problem List   Diagnosis   • Mild Mitral stenosis with mild mitral regurgitation with mild pulmonary hypertension.   • Hypertension   • Dyslipidemia   • Type 2 diabetes mellitus (CMS/HCC)   • Moderate obesity   • History of non-ST elevation myocardial infarction (NSTEMI)       Dear Kreis, Samuel Duane, MD:    Subjective     Chief Complaint   Patient presents with   • Follow-up     check up    • Med Management     list           History of Present Illness:    Melly Wynn is a 69 y.o. female  with a past medical history significant for mild mitral valve stenosis, hypertension, dyslipidemia, diabetes mellitus type 2.  She presents today for routine cardiology follow-up.  She reports since her last visit here, she was diagnosed with a TIA.  She had been on vacation in [place], [place] and began to have expressive aphasia and memory loss.  She was evaluated at a local hospital there and diagnosed with UTI.  Her symptoms worsened and she presented to [facility] in [place], [place].  She reports an MRI of the brain was normal.  She reports her symptoms ultimately resolved.  She also reports a carotid Doppler and echocardiogram are unremarkable.  These records are not available for review today.  She denies any chest pains, shortness of breath, palpitations, dizziness, or lightheadedness.          Allergies   Allergen Reactions   • Lorcet [Hydrocodone-Acetaminophen] GI Intolerance     Extreme Vomiting   • Penicillins Rash     Rash with blisters   • Sulfa Antibiotics Rash     Blisters     :      Current Outpatient Medications:   •  allopurinol (ZYLOPRIM) 100 MG tablet, , Disp: , Rfl:   •  aspirin 81 MG chewable tablet, Chew 81 mg daily., Disp: , Rfl:   •  clopidogrel (PLAVIX) 75 MG tablet, , Disp: , Rfl:   •  Ferrous Sulfate (IRON) 28 MG tablet, Take 65 mg by mouth Daily., Disp: , Rfl:   •  glyBURIDE (DIABETA) 5 MG tablet, Take 5  "mg by mouth 2 (two) times a day., Disp: , Rfl:   •  levothyroxine (SYNTHROID, LEVOTHROID) 88 MCG tablet, Take 88 mcg by mouth daily., Disp: , Rfl:   •  lisinopril (PRINIVIL,ZESTRIL) 20 MG tablet, 20mg AM, 10mg PM., Disp: , Rfl:   •  Omega-3 Fatty Acids (FISH OIL) 1000 MG capsule capsule, Take 1,200 mg by mouth daily with breakfast., Disp: , Rfl:   •  rosuvastatin (CRESTOR) 20 MG tablet, Take 20 mg by mouth daily., Disp: , Rfl:   •  topiramate (TOPAMAX) 50 MG tablet, Take 50 mg by mouth 2 (two) times a day., Disp: , Rfl:       The following portions of the patient's history were reviewed and updated as appropriate: allergies, current medications, past family history, past medical history, past social history, past surgical history and problem list.    Social History     Tobacco Use   • Smoking status: Never Smoker   • Smokeless tobacco: Never Used   Substance Use Topics   • Alcohol use: No   • Drug use: No       Review of Systems   Constitution: Negative for decreased appetite and malaise/fatigue.   Cardiovascular: Negative for chest pain, dyspnea on exertion, irregular heartbeat, leg swelling, near-syncope, orthopnea, palpitations, paroxysmal nocturnal dyspnea and syncope.   Respiratory: Negative for cough, shortness of breath and wheezing.    Neurological: Negative for dizziness, light-headedness and weakness.       Objective   Vitals:    07/17/20 0914   BP: 130/60   BP Location: Left arm   Patient Position: Sitting   Cuff Size: Large Adult   Pulse: 84   Resp: 16   Temp: 99.1 °F (37.3 °C)   TempSrc: Temporal   SpO2: 99%   Weight: 84.7 kg (186 lb 12.8 oz)   Height: 167.6 cm (65.98\")     Body mass index is 30.16 kg/m².        Physical Exam   Constitutional: She is oriented to person, place, and time. She appears well-developed and well-nourished.   HENT:   Head: Normocephalic and atraumatic.   Cardiovascular: Normal rate, regular rhythm and normal heart sounds. Exam reveals no S3 and no S4.   No murmur " heard.  Pulmonary/Chest: Effort normal and breath sounds normal. She has no wheezes. She has no rales.   Abdominal: Soft. Bowel sounds are normal.   Musculoskeletal: She exhibits no edema.   Neurological: She is alert and oriented to person, place, and time.   Skin: Skin is warm and dry.   Psychiatric: She has a normal mood and affect. Her behavior is normal.       Lab Results   Component Value Date     09/23/2019    K 5.0 09/23/2019     09/23/2019    CO2 21.1 (L) 09/23/2019    BUN 31 (H) 09/23/2019    CREATININE 1.22 (H) 09/23/2019    GLUCOSE 135 (H) 09/23/2019    CALCIUM 10.0 09/23/2019    AST 25 04/29/2017    ALT 21 04/29/2017    ALKPHOS 66 04/29/2017    LABIL2 1.9 04/13/2015        Lab Results   Component Value Date    WBC 9.07 04/29/2017    HGB 11.8 (L) 04/29/2017    HCT 37.5 04/29/2017     04/29/2017     Lab Results   Component Value Date    INR 0.94 04/13/2015      Lab Results   Component Value Date    BNP 25 04/13/2015             ECG 12 Lead  Date/Time: 7/17/2020 9:17 AM  Performed by: Anita Mata APRN  Authorized by: Anita Mata APRN   Comparison: compared with previous ECG   Similar to previous ECG  Rhythm: sinus rhythm  BPM: 62                Assessment/Plan    Diagnosis Plan   1. Essential hypertension     2. Mitral valve stenosis, unspecified etiology     3. Type 2 diabetes mellitus without complication, without long-term current use of insulin (CMS/[facility])     4. Dyslipidemia     5. History of non-ST elevation myocardial infarction (NSTEMI)                  Recommendations:    1.  We will request records from [facility] in [place], [place].    2.  I have recommended a 30-day event monitor to rule out any evidence of atrial fibrillation given her recent TIA.  However, she has declined at this time and states she would like to consider this and let us know if she wants to pursue in the future.    3.  Follow-up in 6 months or sooner if needed.        Return in about  6 months (around 1/17/2021) for Recheck.    As always, I appreciate very much the opportunity to participate in the cardiovascular care of your patients.      With Best Regards,    ANTONIA Ward

## 2020-07-27 ENCOUNTER — HOSPITAL ENCOUNTER (OUTPATIENT)
Dept: MAMMOGRAPHY | Facility: HOSPITAL | Age: 70
Discharge: HOME | End: 2020-07-27
Admitting: FAMILY MEDICINE

## 2020-07-27 ENCOUNTER — HOSPITAL ENCOUNTER (OUTPATIENT)
Dept: BONE DENSITY | Facility: HOSPITAL | Age: 70
Discharge: HOME | End: 2020-07-27

## 2020-07-27 DIAGNOSIS — Z12.31 VISIT FOR SCREENING MAMMOGRAM: ICD-10-CM

## 2020-07-27 DIAGNOSIS — M81.0 AGE-RELATED OSTEOPOROSIS WITHOUT CURRENT PATHOLOGICAL FRACTURE: ICD-10-CM

## 2020-07-27 PROCEDURE — 77080 DXA BONE DENSITY AXIAL: CPT | Performed by: RADIOLOGY

## 2020-07-27 PROCEDURE — 77080 DXA BONE DENSITY AXIAL: CPT

## 2020-07-27 PROCEDURE — 77063 BREAST TOMOSYNTHESIS BI: CPT

## 2020-07-27 PROCEDURE — 77067 SCR MAMMO BI INCL CAD: CPT | Performed by: RADIOLOGY

## 2020-07-27 PROCEDURE — 77063 BREAST TOMOSYNTHESIS BI: CPT | Performed by: RADIOLOGY

## 2020-07-27 PROCEDURE — 77067 SCR MAMMO BI INCL CAD: CPT

## 2021-02-25 DIAGNOSIS — Z23 IMMUNIZATION DUE: ICD-10-CM

## 2021-09-07 ENCOUNTER — APPOINTMENT (OUTPATIENT)
Dept: MAMMOGRAPHY | Facility: HOSPITAL | Age: 71
End: 2021-09-07

## 2022-04-07 NOTE — TELEPHONE ENCOUNTER
----- Message from ADALID Richard sent at 5/2/2018  5:30 PM EDT -----  Mild worsening of renal function. Increase water intake and decrease lisinopril to 20mg QD. Recheck BMP on Monday.  
Advised pt of Ute's instructions.  Pt v/u and was agreeable.  Lab orders were placed for cosign.    
abdominal pain